# Patient Record
Sex: FEMALE | Race: BLACK OR AFRICAN AMERICAN | NOT HISPANIC OR LATINO | Employment: FULL TIME | URBAN - METROPOLITAN AREA
[De-identification: names, ages, dates, MRNs, and addresses within clinical notes are randomized per-mention and may not be internally consistent; named-entity substitution may affect disease eponyms.]

---

## 2021-08-14 ENCOUNTER — HOSPITAL ENCOUNTER (EMERGENCY)
Facility: HOSPITAL | Age: 56
Discharge: HOME | End: 2021-08-14
Attending: EMERGENCY MEDICINE
Payer: COMMERCIAL

## 2021-08-14 ENCOUNTER — APPOINTMENT (EMERGENCY)
Dept: RADIOLOGY | Facility: HOSPITAL | Age: 56
End: 2021-08-14
Payer: COMMERCIAL

## 2021-08-14 VITALS
DIASTOLIC BLOOD PRESSURE: 78 MMHG | SYSTOLIC BLOOD PRESSURE: 141 MMHG | WEIGHT: 202.38 LBS | RESPIRATION RATE: 18 BRPM | OXYGEN SATURATION: 98 % | HEART RATE: 67 BPM | TEMPERATURE: 98.3 F

## 2021-08-14 DIAGNOSIS — S80.812A ABRASION, LEFT LOWER LEG, INITIAL ENCOUNTER: ICD-10-CM

## 2021-08-14 DIAGNOSIS — M79.605 BILATERAL LEG PAIN: Primary | ICD-10-CM

## 2021-08-14 DIAGNOSIS — M79.604 BILATERAL LEG PAIN: Primary | ICD-10-CM

## 2021-08-14 PROCEDURE — 73590 X-RAY EXAM OF LOWER LEG: CPT

## 2021-08-14 PROCEDURE — 99284 EMERGENCY DEPT VISIT MOD MDM: CPT | Performed by: EMERGENCY MEDICINE

## 2021-08-14 PROCEDURE — 90715 TDAP VACCINE 7 YRS/> IM: CPT | Performed by: EMERGENCY MEDICINE

## 2021-08-14 PROCEDURE — 99283 EMERGENCY DEPT VISIT LOW MDM: CPT

## 2021-08-14 PROCEDURE — 90471 IMMUNIZATION ADMIN: CPT

## 2021-08-14 RX ORDER — OMEPRAZOLE 20 MG/1
20 CAPSULE, DELAYED RELEASE ORAL DAILY
COMMUNITY
Start: 2021-08-04

## 2021-08-14 RX ORDER — NAPROXEN 500 MG/1
500 TABLET ORAL 2 TIMES DAILY PRN
COMMUNITY

## 2021-08-14 RX ORDER — AMLODIPINE BESYLATE 10 MG/1
10 TABLET ORAL DAILY
COMMUNITY
Start: 2021-08-04

## 2021-08-14 RX ADMIN — TETANUS TOXOID, REDUCED DIPHTHERIA TOXOID AND ACELLULAR PERTUSSIS VACCINE, ADSORBED 0.5 ML: 5; 2.5; 8; 8; 2.5 SUSPENSION INTRAMUSCULAR at 17:58
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
## 2021-08-16 RX ORDER — NAPROXEN 500 MG/1
500 TABLET ORAL 2 TIMES DAILY WITH MEALS
Qty: 30 TABLET | Refills: 0 | Status: SHIPPED | OUTPATIENT
Start: 2021-08-16

## 2024-03-05 ENCOUNTER — HOSPITAL ENCOUNTER (EMERGENCY)
Facility: HOSPITAL | Age: 59
Discharge: HOME | End: 2024-03-05
Attending: EMERGENCY MEDICINE
Payer: COMMERCIAL

## 2024-03-05 VITALS
SYSTOLIC BLOOD PRESSURE: 137 MMHG | HEART RATE: 85 BPM | DIASTOLIC BLOOD PRESSURE: 78 MMHG | WEIGHT: 175.27 LBS | RESPIRATION RATE: 16 BRPM | TEMPERATURE: 98.4 F | OXYGEN SATURATION: 97 %

## 2024-03-05 DIAGNOSIS — J02.0 STREP PHARYNGITIS: Primary | ICD-10-CM

## 2024-03-05 PROCEDURE — 99283 EMERGENCY DEPT VISIT LOW MDM: CPT

## 2024-03-05 PROCEDURE — 99284 EMERGENCY DEPT VISIT MOD MDM: CPT

## 2024-03-05 RX ORDER — AMOXICILLIN 500 MG/1
500 CAPSULE ORAL EVERY 12 HOURS SCHEDULED
Qty: 20 CAPSULE | Refills: 0 | Status: SHIPPED | OUTPATIENT
Start: 2024-03-05 | End: 2024-03-15

## 2024-03-05 RX ORDER — DEXAMETHASONE 4 MG/1
8 TABLET ORAL ONCE
Status: COMPLETED | OUTPATIENT
Start: 2024-03-05 | End: 2024-03-05

## 2024-03-05 RX ORDER — AMOXICILLIN 250 MG/1
500 CAPSULE ORAL ONCE
Status: COMPLETED | OUTPATIENT
Start: 2024-03-05 | End: 2024-03-05

## 2024-03-05 RX ADMIN — DEXAMETHASONE 8 MG: 4 TABLET ORAL at 00:55

## 2024-03-05 RX ADMIN — AMOXICILLIN 500 MG: 250 CAPSULE ORAL at 00:55

## 2024-03-05 NOTE — ED PROVIDER NOTES
History  Chief Complaint   Patient presents with    Sore Throat     Pt c/o chills and sore throat.      58-year-old female presents for evaluation of chills and sore throat for 2 days.  Patient's son diagnosed with strep a couple days ago.  Minimal cough.        Prior to Admission Medications   Prescriptions Last Dose Informant Patient Reported? Taking?   amLODIPine (NORVASC) 10 mg tablet   Yes No   Sig: Take 10 mg by mouth daily   mupirocin (BACTROBAN) 2 % ointment   No No   Sig: Apply topically 3 (three) times a day   naproxen (EC NAPROSYN) 500 MG EC tablet   Yes No   Sig: Take 500 mg by mouth 2 (two) times a day as needed   naproxen (NAPROSYN) 500 mg tablet   No No   Sig: Take 1 tablet (500 mg total) by mouth 2 (two) times a day with meals   omeprazole (PriLOSEC) 20 mg delayed release capsule   Yes No   Sig: Take 20 mg by mouth daily      Facility-Administered Medications: None       No past medical history on file.    Past Surgical History:   Procedure Laterality Date    TUBAL LIGATION  1994       No family history on file.  I have reviewed and agree with the history as documented.    E-Cigarette/Vaping     E-Cigarette/Vaping Substances     Social History     Tobacco Use    Smoking status: Never    Smokeless tobacco: Never   Substance Use Topics    Alcohol use: Never    Drug use: Never       Review of Systems   Constitutional:  Positive for chills. Negative for fever.   HENT:  Positive for sore throat and trouble swallowing. Negative for ear pain.    Eyes:  Negative for pain and visual disturbance.   Respiratory:  Negative for cough and shortness of breath.    Cardiovascular:  Negative for chest pain and palpitations.   Gastrointestinal:  Negative for abdominal pain and vomiting.   Genitourinary:  Negative for dysuria and hematuria.   Musculoskeletal:  Negative for arthralgias and back pain.   Skin:  Negative for color change and rash.   Neurological:  Negative for seizures and syncope.   All other systems  reviewed and are negative.      Physical Exam  Physical Exam  Vitals and nursing note reviewed.   Constitutional:       General: She is not in acute distress.     Appearance: She is well-developed.   HENT:      Head: Normocephalic and atraumatic.      Mouth/Throat:      Pharynx: Posterior oropharyngeal erythema present.      Tonsils: Tonsillar exudate present. No tonsillar abscesses. 2+ on the right. 2+ on the left.   Eyes:      Conjunctiva/sclera: Conjunctivae normal.   Cardiovascular:      Rate and Rhythm: Normal rate and regular rhythm.      Heart sounds: No murmur heard.  Pulmonary:      Effort: Pulmonary effort is normal. No respiratory distress.      Breath sounds: Normal breath sounds.   Abdominal:      Palpations: Abdomen is soft.      Tenderness: There is no abdominal tenderness.   Musculoskeletal:         General: No swelling.      Cervical back: Neck supple.   Lymphadenopathy:      Cervical: Cervical adenopathy present.   Skin:     General: Skin is warm and dry.      Capillary Refill: Capillary refill takes less than 2 seconds.   Neurological:      Mental Status: She is alert.   Psychiatric:         Mood and Affect: Mood normal.         Vital Signs  ED Triage Vitals [03/05/24 0034]   Temperature Pulse Respirations Blood Pressure SpO2   98.4 °F (36.9 °C) 85 16 137/78 97 %      Temp Source Heart Rate Source Patient Position - Orthostatic VS BP Location FiO2 (%)   Oral Monitor Sitting Right arm --      Pain Score       8           Vitals:    03/05/24 0034   BP: 137/78   Pulse: 85   Patient Position - Orthostatic VS: Sitting         Visual Acuity      ED Medications  Medications   dexamethasone (DECADRON) tablet 8 mg (8 mg Oral Given 3/5/24 0055)   amoxicillin (AMOXIL) capsule 500 mg (500 mg Oral Given 3/5/24 0055)       Diagnostic Studies  Results Reviewed       None                   No orders to display              Procedures  Procedures         ED Course                               SBIRT 22yo+       Flowsheet Row Most Recent Value   Initial Alcohol Screen: US AUDIT-C     1. How often do you have a drink containing alcohol? 0 Filed at: 03/05/2024 0058   2. How many drinks containing alcohol do you have on a typical day you are drinking?  0 Filed at: 03/05/2024 0058   3b. FEMALE Any Age, or MALE 65+: How often do you have 4 or more drinks on one occassion? 0 Filed at: 03/05/2024 0058   Audit-C Score 0 Filed at: 03/05/2024 0058   JOSH: How many times in the past year have you...    Used an illegal drug or used a prescription medication for non-medical reasons? Never Filed at: 03/05/2024 0036                      Medical Decision Making  58-year-old female presents for evaluation of chills and sore throat.  Son with strep throat.  Exam: Bilateral tonsillar swelling 2+, exudates present, posterior oropharyngeal erythema; bilateral enlarged and tender cervical adenopathy.    Considering subjective chills, tonsillar swelling exudates, tender cervical adenopathy, no significant cough -will treat empirically for strep throat with amoxicillin.  Will also give dose of Decadron for oropharyngeal swelling.  Educate patient on diagnosis, treatment plan, supportive care, expectations. Patient expresses understanding of the condition, treatment plan, follow-up instructions, and return precautions.      Risk  Prescription drug management.             Disposition  Final diagnoses:   Strep pharyngitis     Time reflects when diagnosis was documented in both MDM as applicable and the Disposition within this note       Time User Action Codes Description Comment    3/5/2024  1:08 AM Javier Garg Add [J02.0] Strep pharyngitis           ED Disposition       ED Disposition   Discharge    Condition   Stable    Date/Time   Tue Mar 5, 2024 0108    Comment   Yohana Sarmiento discharge to home/self care.                   Follow-up Information    None         Discharge Medication List as of 3/5/2024  1:10 AM        START taking these  medications    Details   amoxicillin (AMOXIL) 500 mg capsule Take 1 capsule (500 mg total) by mouth every 12 (twelve) hours for 10 days, Starting Tue 3/5/2024, Until Fri 3/15/2024, Normal           CONTINUE these medications which have NOT CHANGED    Details   amLODIPine (NORVASC) 10 mg tablet Take 10 mg by mouth daily, Starting Wed 8/4/2021, Historical Med      mupirocin (BACTROBAN) 2 % ointment Apply topically 3 (three) times a day, Starting Mon 8/16/2021, Normal      naproxen (EC NAPROSYN) 500 MG EC tablet Take 500 mg by mouth 2 (two) times a day as needed, Historical Med      naproxen (NAPROSYN) 500 mg tablet Take 1 tablet (500 mg total) by mouth 2 (two) times a day with meals, Starting Mon 8/16/2021, Normal      omeprazole (PriLOSEC) 20 mg delayed release capsule Take 20 mg by mouth daily, Starting Wed 8/4/2021, Historical Med             No discharge procedures on file.    PDMP Review       None            ED Provider  Electronically Signed by             Javier Garg PA-C  03/05/24 0450

## 2024-03-05 NOTE — DISCHARGE INSTRUCTIONS
Based on your history and physical exam, you most likely have strep throat.  Please continue taking amoxicillin, 1 tablet by mouth, 2 times each day, for 10 days.  Please continue treating your pain with ibuprofen and Tylenol as needed.  Drink warm fluids such as soup and tea.  You can try other supportive methods such as lozenges.

## 2024-07-12 ENCOUNTER — APPOINTMENT (EMERGENCY)
Dept: RADIOLOGY | Facility: HOSPITAL | Age: 59
End: 2024-07-12
Payer: COMMERCIAL

## 2024-07-12 ENCOUNTER — HOSPITAL ENCOUNTER (EMERGENCY)
Facility: HOSPITAL | Age: 59
Discharge: HOME | End: 2024-07-12
Attending: EMERGENCY MEDICINE
Payer: COMMERCIAL

## 2024-07-12 VITALS
SYSTOLIC BLOOD PRESSURE: 163 MMHG | HEART RATE: 97 BPM | TEMPERATURE: 98.2 F | DIASTOLIC BLOOD PRESSURE: 96 MMHG | OXYGEN SATURATION: 100 % | RESPIRATION RATE: 18 BRPM

## 2024-07-12 DIAGNOSIS — M19.90 OSTEOARTHRITIS: Primary | ICD-10-CM

## 2024-07-12 DIAGNOSIS — E78.5 HLD (HYPERLIPIDEMIA): ICD-10-CM

## 2024-07-12 DIAGNOSIS — I10 HTN (HYPERTENSION): ICD-10-CM

## 2024-07-12 PROCEDURE — 73564 X-RAY EXAM KNEE 4 OR MORE: CPT

## 2024-07-12 PROCEDURE — 99284 EMERGENCY DEPT VISIT MOD MDM: CPT | Performed by: EMERGENCY MEDICINE

## 2024-07-12 PROCEDURE — 96372 THER/PROPH/DIAG INJ SC/IM: CPT

## 2024-07-12 PROCEDURE — 99283 EMERGENCY DEPT VISIT LOW MDM: CPT

## 2024-07-12 RX ORDER — LIDOCAINE 50 MG/G
1 PATCH TOPICAL ONCE
Status: DISCONTINUED | OUTPATIENT
Start: 2024-07-12 | End: 2024-07-12 | Stop reason: HOSPADM

## 2024-07-12 RX ORDER — METHOCARBAMOL 500 MG/1
500 TABLET, FILM COATED ORAL ONCE
Status: COMPLETED | OUTPATIENT
Start: 2024-07-12 | End: 2024-07-12

## 2024-07-12 RX ORDER — ACETAMINOPHEN 325 MG/1
650 TABLET ORAL ONCE
Status: COMPLETED | OUTPATIENT
Start: 2024-07-12 | End: 2024-07-12

## 2024-07-12 RX ORDER — PREDNISONE 20 MG/1
60 TABLET ORAL ONCE
Status: COMPLETED | OUTPATIENT
Start: 2024-07-12 | End: 2024-07-12

## 2024-07-12 RX ORDER — METHYLPREDNISOLONE 4 MG/1
TABLET ORAL
Qty: 21 TABLET | Refills: 0 | Status: SHIPPED | OUTPATIENT
Start: 2024-07-12

## 2024-07-12 RX ORDER — KETOROLAC TROMETHAMINE 30 MG/ML
30 INJECTION, SOLUTION INTRAMUSCULAR; INTRAVENOUS ONCE
Status: COMPLETED | OUTPATIENT
Start: 2024-07-12 | End: 2024-07-12

## 2024-07-12 RX ORDER — AMLODIPINE BESYLATE 10 MG/1
10 TABLET ORAL DAILY
Qty: 30 TABLET | Refills: 0 | Status: SHIPPED | OUTPATIENT
Start: 2024-07-12 | End: 2024-08-11

## 2024-07-12 RX ORDER — METHOCARBAMOL 500 MG/1
500 TABLET, FILM COATED ORAL 2 TIMES DAILY
Qty: 20 TABLET | Refills: 0 | Status: SHIPPED | OUTPATIENT
Start: 2024-07-12

## 2024-07-12 RX ADMIN — KETOROLAC TROMETHAMINE 30 MG: 30 INJECTION, SOLUTION INTRAMUSCULAR; INTRAVENOUS at 15:40

## 2024-07-12 RX ADMIN — LIDOCAINE 1 PATCH: 50 PATCH CUTANEOUS at 15:40

## 2024-07-12 RX ADMIN — ACETAMINOPHEN 650 MG: 325 TABLET, FILM COATED ORAL at 15:40

## 2024-07-12 RX ADMIN — METHOCARBAMOL 500 MG: 500 TABLET ORAL at 16:07

## 2024-07-12 RX ADMIN — PREDNISONE 60 MG: 20 TABLET ORAL at 16:51

## 2024-07-12 NOTE — ED PROVIDER NOTES
"History  Chief Complaint   Patient presents with    Leg Pain     Pt reports L leg pain. Pt states that it started last night. Pt decribes it as warm. Pt denies any injury.      PatientIs a 58-year-old female coming in today complaining of pain from her left back/buttock down her left leg.  Patient has a history of hypertension and hyperlipidemia as well as \"arthritis\" patient states this started several days ago and progressively worsening.  She reports that her left knee is bothering her as well.  She has no trauma or injury.  No fevers, chills, IV drug abuse.  No saddle anesthesia or urinary retention.  She denies any focal weakness and/or paresthesias throughout the bilateral lower extremities.  She states that she has had this before but not in a while.  She has pain that intermittently radiates down the left leg.  She took Tylenol without any relief      History provided by:  Medical records and patient   used: No    Leg Pain  Location:  Hip, knee and leg  Injury: no    Hip location:  L hip  Leg location:  L upper leg  Knee location:  L knee  Pain details:     Quality:  Aching, pressure and shooting    Severity:  Mild    Onset quality:  Gradual    Timing:  Intermittent    Progression:  Waxing and waning  Chronicity:  Recurrent  Dislocation: no    Tetanus status:  Up to date  Prior injury to area:  No  Relieved by:  Nothing  Worsened by:  Nothing  Ineffective treatments:  Acetaminophen  Associated symptoms: no back pain, no decreased ROM, no fatigue, no fever, no itching, no muscle weakness, no neck pain, no numbness, no stiffness, no swelling and no tingling    Risk factors: no concern for non-accidental trauma, no frequent fractures, no known bone disorder, no obesity and no recent illness                Prior to Admission Medications   Prescriptions Last Dose Informant Patient Reported? Taking?   amLODIPine (NORVASC) 10 mg tablet   Yes No   Sig: Take 10 mg by mouth daily   amLODIPine " (NORVASC) 10 mg tablet   No Yes   Sig: Take 1 tablet (10 mg total) by mouth daily   mupirocin (BACTROBAN) 2 % ointment   No No   Sig: Apply topically 3 (three) times a day   naproxen (EC NAPROSYN) 500 MG EC tablet   Yes No   Sig: Take 500 mg by mouth 2 (two) times a day as needed   naproxen (NAPROSYN) 500 mg tablet   No No   Sig: Take 1 tablet (500 mg total) by mouth 2 (two) times a day with meals   omeprazole (PriLOSEC) 20 mg delayed release capsule   Yes No   Sig: Take 20 mg by mouth daily      Facility-Administered Medications: None       History reviewed. No pertinent past medical history.    Past Surgical History:   Procedure Laterality Date    TUBAL LIGATION  1994       History reviewed. No pertinent family history.  I have reviewed and agree with the history as documented.    E-Cigarette/Vaping     E-Cigarette/Vaping Substances     Social History     Tobacco Use    Smoking status: Never    Smokeless tobacco: Never   Substance Use Topics    Alcohol use: Never    Drug use: Never       Review of Systems   Constitutional: Negative.  Negative for chills, fatigue and fever.   HENT: Negative.  Negative for ear pain and sore throat.    Eyes: Negative.  Negative for pain and visual disturbance.   Respiratory: Negative.  Negative for cough and shortness of breath.    Cardiovascular: Negative.  Negative for chest pain and palpitations.   Gastrointestinal: Negative.  Negative for abdominal pain and vomiting.   Genitourinary: Negative.  Negative for dysuria and hematuria.   Musculoskeletal: Negative.  Negative for arthralgias, back pain, neck pain and stiffness.   Skin:  Negative for color change, itching and rash.   Neurological:  Negative for seizures and syncope.   Hematological: Negative.    Psychiatric/Behavioral: Negative.     All other systems reviewed and are negative.      Physical Exam  Physical Exam  Vitals and nursing note reviewed.   Constitutional:       General: She is not in acute distress.     Appearance:  She is well-developed. She is not diaphoretic.   HENT:      Head: Normocephalic and atraumatic.      Nose: Nose normal.      Mouth/Throat:      Pharynx: No oropharyngeal exudate.   Eyes:      General: No scleral icterus.     Extraocular Movements: Extraocular movements intact.      Conjunctiva/sclera: Conjunctivae normal.      Pupils: Pupils are equal, round, and reactive to light.   Neck:      Trachea: No tracheal deviation.   Cardiovascular:      Pulses:           Popliteal pulses are 2+ on the right side and 2+ on the left side.        Dorsalis pedis pulses are 2+ on the right side and 2+ on the left side.   Pulmonary:      Effort: Pulmonary effort is normal. No respiratory distress.   Abdominal:      General: Bowel sounds are normal. There is no distension.      Palpations: Abdomen is soft.      Tenderness: There is no abdominal tenderness. There is no rebound.   Musculoskeletal:         General: No tenderness or deformity. Normal range of motion.      Cervical back: Normal range of motion and neck supple.        Back:       Right lower leg: No edema.      Left lower leg: No edema.        Legs:    Skin:     General: Skin is warm and dry.      Findings: No erythema or rash.   Neurological:      General: No focal deficit present.      Mental Status: She is alert and oriented to person, place, and time.      GCS: GCS eye subscore is 4. GCS verbal subscore is 5. GCS motor subscore is 6.      Cranial Nerves: No cranial nerve deficit.      Sensory: Sensation is intact.      Motor: Motor function is intact.      Coordination: Coordination normal.      Deep Tendon Reflexes: Reflexes are normal and symmetric.      Comments: Patient is followed range of motion of bilateral hips, his knees and ankles without any discomfort to her low back pain-free.  She does have some discomfort to bilateral knees with bilateral knee flexion.   Psychiatric:         Mood and Affect: Mood normal.         Behavior: Behavior normal.          "Thought Content: Thought content normal.         Judgment: Judgment normal.         Vital Signs  ED Triage Vitals [07/12/24 1442]   Temperature Pulse Respirations Blood Pressure SpO2   98.2 °F (36.8 °C) 67 16 149/88 99 %      Temp Source Heart Rate Source Patient Position - Orthostatic VS BP Location FiO2 (%)   Oral Monitor Lying Right arm --      Pain Score       10 - Worst Possible Pain           Vitals:    07/12/24 1442 07/12/24 1653   BP: 149/88 163/96   Pulse: 67 97   Patient Position - Orthostatic VS: Lying Sitting         Visual Acuity      ED Medications  Medications   acetaminophen (TYLENOL) tablet 650 mg (650 mg Oral Given 7/12/24 1540)   ketorolac (TORADOL) injection 30 mg (30 mg Intramuscular Given 7/12/24 1540)   methocarbamol (ROBAXIN) tablet 500 mg (500 mg Oral Given 7/12/24 1607)   predniSONE tablet 60 mg (60 mg Oral Given 7/12/24 1651)       Diagnostic Studies  Results Reviewed       None                   XR knee 4+ vw left injury   Final Result by Chip Galvez MD (07/12 1607)      No acute osseous abnormality.      Degenerative changes as described.         Computerized Assisted Algorithm (CAA) may have been used to analyze all applicable images.         Workstation performed: [id number]                    Procedures  Procedures         ED Course  ED Course as of 07/12/24 1910 Fri Jul 12, 2024   1521 Patient is a 58 year old female coming in with onset of back pain radiating into LLE starting yesterday. On exam, no obvious injury or deformity. VSS and neuro intact. Will obtain UA/PVR, Lspine xray.      Disclosure: Voice to text software was used in the preparation of this document and could have resulted in translational errors.      Occasional wrong word or \"sound a like\" substitutions may have occurred due to the inherent limitations of voice recognition software.  Read the chart carefully and recognize, using context, where substitutions have occurred.       I have independently reviewed " external records are available to me to the level of detail possible within the time constraints of my patient care responsibilities in the ED.       1541 Received message that patient is declining x-ray of her lumbar spine and wants an x-ray of her right leg.  Patient had no complaints of right lower extremity pain on my exam   1550 Patient declines lumbar spine x-ray after talking with her.  She is aware of my concern that this is a radiculopathy and cannot rule out lumbar pathology and patient understands this wishes for left knee x-ray only   1615 Severe tricompartmental osteoarthritis, evidenced by full-thickness articular cartilage loss, marginal osteophytes, and subchondral sclerosis and cysts.   1650 Long discussion with patient regarding x-ray.  Patient states that she just moved to the area.  Will put amatory referral in for PCP as well as orthopedics.  Patient is out of her hypertensive medications we will refill amlodipine as well.  Return to ER instructions given as well as discussed with her strengthening and stretching exercises.  Answered questions answered at bedside    Counseling: I had a detailed discussion with the patient and/or guardian regarding: the historical points, exam findings, and any diagnostic results supporting the discharge diagnosis, lab results, radiology results, discharge instructions reviewed with patient and/or family/caregiver and understanding was verbalized. Instructions given to return to the emergency department if symptoms worsen or persist, or if there are any questions or concerns that arise at home.     All imaging and/or lab testing discussed with patient, strict return to ED precautions discussed. Patient recommended to follow up promptly with appropriate outpatient provider. Patient and/or family members verbalizes understanding and agrees with plan. Patient and/or family members were given opportunity to ask questions, all questions were answered at this time.  Patient is stable for discharge                                     SBIRT 22yo+      Flowsheet Row Most Recent Value   Initial Alcohol Screen: US AUDIT-C     1. How often do you have a drink containing alcohol? 0 Filed at: 07/12/2024 1443   2. How many drinks containing alcohol do you have on a typical day you are drinking?  0 Filed at: 07/12/2024 1443   3b. FEMALE Any Age, or MALE 65+: How often do you have 4 or more drinks on one occassion? 0 Filed at: 07/12/2024 1443   Audit-C Score 0 Filed at: 07/12/2024 1443   JOSH: How many times in the past year have you...    Used an illegal drug or used a prescription medication for non-medical reasons? Never Filed at: 07/12/2024 1443                      Medical Decision Making  Differential diagnosis includes but not limited to:  Fracture, stress fracture, DVT, superficial thrombophlebitis, cellulitis, renal failure, ruptured Baker cyst, venous insufficiency, lymphadenitis, lymphedema, trauma    Based on history and physical concern for worsening degenerative changes versus sciatica.  Did consider DVT versus fracture versus stress fracture but patient's history and physical reveals this less likely.    Amount and/or Complexity of Data Reviewed  External Data Reviewed: notes.     Details: Chart review shows that patient follows closely in [place] and does have a history of bilateral knee osteoarthritis, hypertension, hyperlipidemia.  Patient was last seen in March by her PCP in [place] and was diagnosed with chronic bilateral low back pain without sciatica supposed to be taken Lidoderm patches as well as cyclobenzaprine, Tylenol, PT as well as topical Voltaren gel.  Radiology: ordered and independent interpretation performed. Decision-making details documented in ED Course.     Details: Left knee x-ray interpreted by myself at Severe tricompartmental osteoarthritis, evidenced by full-thickness articular cartilage loss, marginal osteophytes, and subchondral sclerosis and  cysts.    Risk  OTC drugs.  Prescription drug management.                 Disposition  Final diagnoses:   Osteoarthritis   HTN (hypertension)   HLD (hyperlipidemia)     Time reflects when diagnosis was documented in both MDM as applicable and the Disposition within this note       Time User Action Codes Description Comment    7/12/2024  4:16 PM Bendnixon, Nasra L Add [M19.90] Osteoarthritis     7/12/2024  4:48 PM Bendock, Nasra L Add [I10] HTN (hypertension)     7/12/2024  4:48 PM Bendock, Nasra L Add [E78.5] HLD (hyperlipidemia)           ED Disposition       ED Disposition   Discharge    Condition   Stable    Date/Time   Fri Jul 12, 2024 1616    Comment   Yohana Casonno discharge to home/self care.                   Follow-up Information       Follow up With Specialties Details Why Contact Info Additional Information    [facility] [place] [facility] Schedule an appointment as soon as possible for a visit in 1 week  [address]  [facility] [address] [phone] [facility] [place], [facility] [address], [address], [place], [place], [address] [phone]            Discharge Medication List as of 7/12/2024  4:49 PM        START taking these medications    Details   Diclofenac Sodium (VOLTAREN) 1 % Apply 2 g topically 4 (four) times a day, Starting Fri 7/12/2024, Normal      methocarbamol (ROBAXIN) 500 mg tablet Take 1 tablet (500 mg total) by mouth 2 (two) times a day, Starting Fri 7/12/2024, Normal      methylPREDNISolone 4 MG tablet therapy pack Use as directed on package, Normal           CONTINUE these medications which have CHANGED    Details   amLODIPine (NORVASC) 10 mg tablet Take 1 tablet (10 mg total) by mouth daily, Starting Fri 7/12/2024, Until Sun 8/11/2024, Normal           CONTINUE these medications which have NOT CHANGED    Details   mupirocin (BACTROBAN) 2 % ointment Apply topically 3 (three) times a day,  Starting Mon 8/16/2021, Normal      naproxen (EC NAPROSYN) 500 MG EC tablet Take 500 mg by mouth 2 (two) times a day as needed, Historical Med      naproxen (NAPROSYN) 500 mg tablet Take 1 tablet (500 mg total) by mouth 2 (two) times a day with meals, Starting Mon 8/16/2021, Normal      omeprazole (PriLOSEC) 20 mg delayed release capsule Take 20 mg by mouth daily, Starting Wed 8/4/2021, Historical Med                 PDMP Review       None            ED Provider  Electronically Signed by             Nasra Weber DO  07/12/24 1910

## 2024-07-12 NOTE — Clinical Note
Yohana Sarmiento was seen and treated in our emergency department on 7/12/2024.                Diagnosis:     Yohana  .    She may return on this date:          If you have any questions or concerns, please don't hesitate to call.      Nasra Weber, DO    ______________________________           _______________          _______________  Hospital Representative                              Date                                Time

## 2024-07-12 NOTE — Clinical Note
Yohana Sarmiento was seen and treated in our emergency department on 7/12/2024.    No restrictions            Diagnosis:     Yohana  may return to work on return date.    She may return on this date: 07/13/2024         If you have any questions or concerns, please don't hesitate to call.      Donna Avendano RN    ______________________________           _______________          _______________  Hospital Representative                              Date                                Time

## 2024-07-12 NOTE — Clinical Note
Yohana Sarmiento was seen and treated in our emergency department on 7/12/2024.    No restrictions            Diagnosis:     Yohana  may return to work on return date.    She may return on this date: 07/16/2024         If you have any questions or concerns, please don't hesitate to call.      Donna Avendano RN    ______________________________           _______________          _______________  Hospital Representative                              Date                                Time

## 2024-07-24 ENCOUNTER — RA CDI HCC (OUTPATIENT)
Dept: OTHER | Facility: HOSPITAL | Age: 59
End: 2024-07-24

## 2024-07-30 PROBLEM — G89.29 CHRONIC BILATERAL LOW BACK PAIN WITHOUT SCIATICA: Status: ACTIVE | Noted: 2022-02-24

## 2024-07-30 PROBLEM — M19.90 OSTEOARTHRITIS: Status: ACTIVE | Noted: 2019-02-20

## 2024-07-30 PROBLEM — M54.50 CHRONIC BILATERAL LOW BACK PAIN WITHOUT SCIATICA: Status: ACTIVE | Noted: 2022-02-24

## 2024-08-12 ENCOUNTER — OFFICE VISIT (OUTPATIENT)
Dept: FAMILY MEDICINE CLINIC | Facility: CLINIC | Age: 59
End: 2024-08-12
Payer: COMMERCIAL

## 2024-08-12 VITALS
HEART RATE: 90 BPM | TEMPERATURE: 97.9 F | SYSTOLIC BLOOD PRESSURE: 126 MMHG | BODY MASS INDEX: 26.03 KG/M2 | HEIGHT: 70 IN | OXYGEN SATURATION: 98 % | DIASTOLIC BLOOD PRESSURE: 82 MMHG | WEIGHT: 181.8 LBS

## 2024-08-12 DIAGNOSIS — E78.5 HLD (HYPERLIPIDEMIA): ICD-10-CM

## 2024-08-12 DIAGNOSIS — M79.10 MYALGIA: ICD-10-CM

## 2024-08-12 DIAGNOSIS — M19.90 OSTEOARTHRITIS: ICD-10-CM

## 2024-08-12 DIAGNOSIS — G89.29 CHRONIC BILATERAL LOW BACK PAIN WITHOUT SCIATICA: ICD-10-CM

## 2024-08-12 DIAGNOSIS — Z11.59 NEED FOR HEPATITIS C SCREENING TEST: ICD-10-CM

## 2024-08-12 DIAGNOSIS — I10 ESSENTIAL HYPERTENSION: ICD-10-CM

## 2024-08-12 DIAGNOSIS — K21.9 GASTROESOPHAGEAL REFLUX DISEASE, UNSPECIFIED WHETHER ESOPHAGITIS PRESENT: ICD-10-CM

## 2024-08-12 DIAGNOSIS — Z11.4 SCREENING FOR HIV (HUMAN IMMUNODEFICIENCY VIRUS): ICD-10-CM

## 2024-08-12 DIAGNOSIS — E78.2 MIXED HYPERLIPIDEMIA: ICD-10-CM

## 2024-08-12 DIAGNOSIS — Z12.31 ENCOUNTER FOR SCREENING MAMMOGRAM FOR MALIGNANT NEOPLASM OF BREAST: Primary | ICD-10-CM

## 2024-08-12 DIAGNOSIS — I10 HTN (HYPERTENSION): ICD-10-CM

## 2024-08-12 DIAGNOSIS — M54.50 CHRONIC BILATERAL LOW BACK PAIN WITHOUT SCIATICA: ICD-10-CM

## 2024-08-12 DIAGNOSIS — Z12.11 SCREENING FOR COLON CANCER: ICD-10-CM

## 2024-08-12 PROCEDURE — 99204 OFFICE O/P NEW MOD 45 MIN: CPT | Performed by: PHYSICIAN ASSISTANT

## 2024-08-12 RX ORDER — AMLODIPINE BESYLATE 10 MG/1
10 TABLET ORAL DAILY
Qty: 30 TABLET | Refills: 0 | Status: SHIPPED | OUTPATIENT
Start: 2024-08-12 | End: 2024-08-13

## 2024-08-12 RX ORDER — ROSUVASTATIN CALCIUM 5 MG/1
5 TABLET, COATED ORAL DAILY
Qty: 30 TABLET | Refills: 11 | Status: SHIPPED | OUTPATIENT
Start: 2024-08-12 | End: 2024-08-13

## 2024-08-12 NOTE — PATIENT INSTRUCTIONS
1. Encounter for screening mammogram for malignant neoplasm of breast  -     Mammo screening bilateral w 3d & cad; Future  2. Screening for colon cancer  -     Ambulatory Referral to Gastroenterology; Future  3. Osteoarthritis  Assessment & Plan:  L knee with severe arthritis. IN [place] injections only helped for a few months.   Orders:  -     Ambulatory Referral to Family Practice  4. HTN (hypertension)  -     Ambulatory Referral to Family Practice  -     amLODIPine (NORVASC) 10 mg tablet; Take 1 tablet (10 mg total) by mouth daily  5. HLD (hyperlipidemia)  -     Ambulatory Referral to Family Practice  -     Lipid Panel with Direct LDL reflex  6. Essential hypertension  Assessment & Plan:  Well-controlled on Norvasc 10.  Refills given.  CBC and CMP ordered.  Orders:  -     CBC and differential  -     Comprehensive metabolic panel  7. Mixed hyperlipidemia  Assessment & Plan:  Patient is on Crestor 5 and needs a lipid panel.  Pt has been not taking it for the past month. This will play into what we will do with her results if still high. Fasting lipid panel ordered.  Orders:  -     rosuvastatin (CRESTOR) 5 mg tablet; Take 1 tablet (5 mg total) by mouth daily  8. Myalgia  -     Vitamin D 25 hydroxy  9. Need for hepatitis C screening test  -     Hepatitis C Antibody; Future  10. Screening for HIV (human immunodeficiency virus)  -     HIV 1/2 AG/AB w Reflex [facility] for 2 yr old and above; Future  11. Gastroesophageal reflux disease, unspecified whether esophagitis present  Assessment & Plan:  Had EGD in 2018. Uses pepcid twice a day.   12. Chronic bilateral low back pain without sciatica  Assessment & Plan:  PT did have a lumbar spine xray ordered which was cancelled so it was reordered today.   Orders:  -     XR spine lumbar minimum 4 views non injury; Future; Expected date: 08/12/2024

## 2024-08-12 NOTE — PROGRESS NOTES
Ambulatory Visit  Name: Yohana Sarmiento      : 1965      MRN: [id number]  Encounter Provider: Carla Arzola PA-C  Encounter Date: 2024   Encounter department: [facility]    Assessment & Plan   1. Encounter for screening mammogram for malignant neoplasm of breast  -     Mammo screening bilateral w 3d & cad; Future  2. Screening for colon cancer  -     Ambulatory Referral to Gastroenterology; Future  3. Osteoarthritis  Assessment & Plan:  L knee with severe arthritis. IN [place] injections only helped for a few months.   Orders:  -     Ambulatory Referral to Family Practice  4. HTN (hypertension)  -     Ambulatory Referral to Family Practice  -     amLODIPine (NORVASC) 10 mg tablet; Take 1 tablet (10 mg total) by mouth daily  5. HLD (hyperlipidemia)  -     Ambulatory Referral to Family Practice  -     Lipid Panel with Direct LDL reflex  6. Essential hypertension  Assessment & Plan:  Well-controlled on Norvasc 10.  Refills given.  CBC and CMP ordered.  Orders:  -     CBC and differential  -     Comprehensive metabolic panel  7. Mixed hyperlipidemia  Assessment & Plan:  Patient is on Crestor 5 and needs a lipid panel.  Pt has been not taking it for the past month. This will play into what we will do with her results if still high. Fasting lipid panel ordered.  Orders:  -     rosuvastatin (CRESTOR) 5 mg tablet; Take 1 tablet (5 mg total) by mouth daily  8. Myalgia  -     Vitamin D 25 hydroxy  9. Need for hepatitis C screening test  -     Hepatitis C Antibody; Future  10. Screening for HIV (human immunodeficiency virus)  -     HIV 1/2 AG/AB w Reflex [facility] for 2 yr old and above; Future  11. Gastroesophageal reflux disease, unspecified whether esophagitis present  Assessment & Plan:  Had EGD in 2018. Uses pepcid twice a day.   12. Chronic bilateral low back pain without sciatica  Assessment & Plan:  PT did have a lumbar spine xray ordered which was cancelled so it was reordered today.  "  Orders:  -     XR spine lumbar minimum 4 views non injury; Future; Expected date: 08/12/2024      Depression Screening and Follow-up Plan: Patient was screened for depression during today's encounter. They screened negative with a PHQ-2 score of 0.      History of Present Illness     Patient presents with:  New Patient Visit: Establishing care , moved from [place] back in April   Agreeable to mammo - pt last had colonoscopy 2021 , referral placed to schedule future apt   Hypertension: Needs refill on BP medication - pending   Arthritis: To soy knees    Per review of notes from [place] had 2018 colonoscopy and was due for repeat in 5 years. Records of procedure can not be found through the portal however.   Last blood work was done a year ago.  Patient states that she was put on Crestor 5 but has not been taking it for the last month not sure if she had blood work done after it was started.  She does have severe left knee arthritis and does take naproxen as needed.  Did have an appointment to see Ortho this was canceled so does need to reschedule this.  Use to do injections in [place] which only helped for short amount of time.  Most likely due for knee replacement.  Patient does also complain of right low back pain that comes and goes with referring down to her legs.  Muscle relaxer does not seem to help.  Did have a x-ray order but then it was canceled.        Review of Systems   Constitutional: Negative.    HENT: Negative.     Eyes: Negative.    Respiratory: Negative.     Cardiovascular: Negative.    Gastrointestinal: Negative.    Endocrine: Negative.    Genitourinary: Negative.    Musculoskeletal: Negative.    Skin: Negative.    Allergic/Immunologic: Negative.    Neurological: Negative.    Hematological: Negative.    Psychiatric/Behavioral: Negative.         Objective     /82 (BP Location: Right arm, Patient Position: Sitting, Cuff Size: Adult)   Pulse 90   Temp 97.9 °F (36.6 °C) (Temporal)   Ht 5' 10\" (1.778 " m)   Wt 82.5 kg (181 lb 12.8 oz)   SpO2 98%   BMI 26.09 kg/m²     Physical Exam  Vitals and nursing note reviewed.   Constitutional:       Appearance: Normal appearance. She is well-developed.   HENT:      Head: Normocephalic and atraumatic.   Eyes:      General: Lids are normal.      Conjunctiva/sclera: Conjunctivae normal.      Pupils: Pupils are equal, round, and reactive to light.   Cardiovascular:      Rate and Rhythm: Normal rate and regular rhythm.      Heart sounds: No murmur heard.  Pulmonary:      Effort: Pulmonary effort is normal.      Breath sounds: Normal breath sounds.   Musculoskeletal:      Comments: Patient is walking with a limp secondary to pain in her left knee.   Skin:     General: Skin is warm and dry.   Neurological:      General: No focal deficit present.      Mental Status: She is alert.      Coordination: Coordination is intact.   Psychiatric:         Mood and Affect: Mood normal.         Behavior: Behavior normal. Behavior is cooperative.         Thought Content: Thought content normal.         Judgment: Judgment normal.       Administrative Statements

## 2024-08-12 NOTE — ASSESSMENT & PLAN NOTE
Patient is on Crestor 5 and needs a lipid panel.  Pt has been not taking it for the past month. This will play into what we will do with her results if still high. Fasting lipid panel ordered.

## 2024-08-13 RX ORDER — ROSUVASTATIN CALCIUM 5 MG/1
5 TABLET, COATED ORAL DAILY
Qty: 90 TABLET | Refills: 0 | Status: SHIPPED | OUTPATIENT
Start: 2024-08-13

## 2024-08-13 RX ORDER — AMLODIPINE BESYLATE 10 MG/1
10 TABLET ORAL DAILY
Qty: 90 TABLET | Refills: 0 | Status: SHIPPED | OUTPATIENT
Start: 2024-08-13

## 2024-09-05 ENCOUNTER — TELEPHONE (OUTPATIENT)
Age: 59
End: 2024-09-05

## 2024-09-05 ENCOUNTER — PREP FOR PROCEDURE (OUTPATIENT)
Age: 59
End: 2024-09-05

## 2024-09-05 DIAGNOSIS — Z12.11 SPECIAL SCREENING FOR MALIGNANT NEOPLASMS, COLON: Primary | ICD-10-CM

## 2024-09-05 NOTE — TELEPHONE ENCOUNTER
09/05/24  Screened by: Allison Buckner MA    Referring Provider PCP    Pre- Screening:     There is no height or weight on file to calculate BMI.  Has patient been referred for a routine screening Colonoscopy? yes  Is the patient between 45-75 years old? yes      Previous Colonoscopy yes   If yes:    Date: 7283-2172    Facility: [place]    Reason: screening      Does the patient want to see a Gastroenterologist prior to their procedure OR are they having any GI symptoms? no    Has the patient been hospitalized or had abdominal surgery in the past 6 months? no    Does the patient use supplemental oxygen? no    Does the patient take Coumadin, Lovenox, Plavix, Elliquis, Xarelto, or other blood thinning medication? no    Has the patient had a stroke, cardiac event, or stent placed in the past year? no        If patient is between 45yrs - 49yrs, please advise patient that we will have to confirm benefits & coverage with their insurance company for a routine screening colonoscopy.

## 2024-09-05 NOTE — TELEPHONE ENCOUNTER
Scheduled date of colonoscopy (as of today): 9/27/2024   Physician performing colonoscopy: DR MATTSON  Location of colonoscopy: [facility]  Bowel prep reviewed with patient: MIRALAX/DULCOLAX  Instructions reviewed with patient by: Allison via telephone. Procedure directions sent via email [email or web address]'  Clearances: n/a

## 2024-09-13 ENCOUNTER — ANESTHESIA EVENT (OUTPATIENT)
Dept: ANESTHESIOLOGY | Facility: HOSPITAL | Age: 59
End: 2024-09-13

## 2024-09-13 ENCOUNTER — ANESTHESIA (OUTPATIENT)
Dept: ANESTHESIOLOGY | Facility: HOSPITAL | Age: 59
End: 2024-09-13

## 2024-09-20 ENCOUNTER — TELEPHONE (OUTPATIENT)
Age: 59
End: 2024-09-20

## 2024-09-20 NOTE — TELEPHONE ENCOUNTER
Scheduled date of colonoscopy (as of today): 12/9/24  Physician performing colonoscopy: Dr. Holguin  Location of colonoscopy: [facility]  Bowel prep reviewed with patient: Miralax / Dulcolax   Instructions reviewed with patient by: N/A  Clearances: N/A    Pt rescheduled her colonoscopy.  Advised pt to bring her insurance ID cards and any copayment if required by her insurance.

## 2024-11-08 ENCOUNTER — OFFICE VISIT (OUTPATIENT)
Dept: FAMILY MEDICINE CLINIC | Facility: CLINIC | Age: 59
End: 2024-11-08
Payer: COMMERCIAL

## 2024-11-08 VITALS
WEIGHT: 190 LBS | BODY MASS INDEX: 27.2 KG/M2 | HEART RATE: 67 BPM | HEIGHT: 70 IN | OXYGEN SATURATION: 97 % | SYSTOLIC BLOOD PRESSURE: 122 MMHG | DIASTOLIC BLOOD PRESSURE: 82 MMHG | TEMPERATURE: 97 F

## 2024-11-08 DIAGNOSIS — Z00.00 ANNUAL PHYSICAL EXAM: Primary | ICD-10-CM

## 2024-11-08 DIAGNOSIS — K21.9 GASTROESOPHAGEAL REFLUX DISEASE, UNSPECIFIED WHETHER ESOPHAGITIS PRESENT: ICD-10-CM

## 2024-11-08 DIAGNOSIS — Z12.4 SCREENING FOR CERVICAL CANCER: ICD-10-CM

## 2024-11-08 DIAGNOSIS — I10 ESSENTIAL HYPERTENSION: ICD-10-CM

## 2024-11-08 PROCEDURE — 99396 PREV VISIT EST AGE 40-64: CPT | Performed by: NURSE PRACTITIONER

## 2024-11-08 PROCEDURE — 99214 OFFICE O/P EST MOD 30 MIN: CPT | Performed by: NURSE PRACTITIONER

## 2024-11-08 RX ORDER — FAMOTIDINE 40 MG/1
40 TABLET, FILM COATED ORAL
Qty: 90 TABLET | Refills: 1 | Status: SHIPPED | OUTPATIENT
Start: 2024-11-08

## 2024-11-08 RX ORDER — PANTOPRAZOLE SODIUM 40 MG/1
40 TABLET, DELAYED RELEASE ORAL
Qty: 90 TABLET | Refills: 1 | Status: SHIPPED | OUTPATIENT
Start: 2024-11-08

## 2024-11-08 RX ORDER — AMLODIPINE BESYLATE 10 MG/1
10 TABLET ORAL DAILY
Qty: 90 TABLET | Refills: 1 | Status: SHIPPED | OUTPATIENT
Start: 2024-11-08

## 2024-11-08 NOTE — PROGRESS NOTES
Adult Annual Physical  Name: Yohana Sarmiento      : 1965      MRN: [id number]  Encounter Provider: EDWARD Gambino  Encounter Date: 2024   Encounter department: [facility]    Assessment & Plan  Annual physical exam         Gastroesophageal reflux disease, unspecified whether esophagitis present  Since the patient's GERD symptoms are currently uncontrolled she will be trialed on Pepcid 40 mg at bedtime and Protonix 40 mg prior to breakfast.  I did advise her to take the Protonix at least 30 minutes prior to eating in the morning.  Her symptoms can be reassessed at her next office visit.  Orders:    famotidine (PEPCID) 40 MG tablet; Take 1 tablet (40 mg total) by mouth daily at bedtime    pantoprazole (PROTONIX) 40 mg tablet; Take 1 tablet (40 mg total) by mouth daily before breakfast    Screening for cervical cancer    Orders:    Ambulatory Referral to Obstetrics / Gynecology; Future    Essential hypertension  Well-controlled on current regimen.    Orders:    amLODIPine (NORVASC) 10 mg tablet; Take 1 tablet (10 mg total) by mouth daily    Immunizations and preventive care screenings were discussed with patient today. Appropriate education was printed on patient's after visit summary.    Counseling:  Alcohol/drug use: discussed moderation in alcohol intake, the recommendations for healthy alcohol use, and avoidance of illicit drug use.  Dental Health: discussed importance of regular tooth brushing, flossing, and dental visits.  Injury prevention: discussed safety/seat belts, safety helmets, smoke detectors, carbon monoxide detectors, and smoking near bedding or upholstery.  Sexual health: discussed sexually transmitted diseases, partner selection, use of condoms, avoidance of unintended pregnancy, and contraceptive alternatives.  Exercise: the importance of regular exercise/physical activity was discussed. Recommend exercise 3-5 times per week for at least 30 minutes.       Hypertension: Well-controlled on current regimen.    GERD: Patient is currently managed on Pepcid 20 mg twice daily however, she reports that this medication is not completely controlling her GERD symptoms.    History of Present Illness     Adult Annual Physical:  Patient presents for annual physical.     Diet and Physical Activity:  - Diet/Nutrition: well balanced diet, consuming 3-5 servings of fruits/vegetables daily and limited junk food.  - Exercise: walking, 3-4 times a week on average and 1-2 hours on average.    General Health:  - Sleep: 7-8 hours of sleep on average and sleeps well.  - Hearing: normal hearing bilateral ears.  - Vision: wears glasses, no vision problems and most recent eye exam < 1 year ago.  - Dental: regular dental visits and brushes teeth twice daily.    /GYN Health:  - Follows with GYN: no.   - Menopause: postmenopausal.   - History of STDs: no    Advanced Care Planning:  - Has an advanced directive?: no    - Has a durable medical POA?: no    - ACP document given to patient?: no      Review of Systems   Constitutional:  Negative for chills and fever.   HENT:  Negative for ear pain and sore throat.    Eyes:  Negative for pain and visual disturbance.   Respiratory:  Negative for cough, chest tightness, shortness of breath and wheezing.    Cardiovascular:  Negative for chest pain, palpitations and leg swelling.   Gastrointestinal:  Negative for abdominal pain, constipation, diarrhea, nausea and vomiting.        GERD symptoms   Endocrine: Negative for cold intolerance and heat intolerance.   Genitourinary:  Negative for decreased urine volume, dysuria and hematuria.   Musculoskeletal:  Negative for arthralgias, back pain and myalgias.   Skin:  Negative for color change and rash.   Allergic/Immunologic: Negative for environmental allergies.   Neurological:  Negative for dizziness, seizures, syncope, weakness, light-headedness, numbness and headaches.   Hematological:  Negative for  "adenopathy.   Psychiatric/Behavioral:  Negative for confusion. The patient is not nervous/anxious.    All other systems reviewed and are negative.        Objective     /82   Pulse 67   Temp (!) 97 °F (36.1 °C)   Ht 5' 10\" (1.778 m)   Wt 86.2 kg (190 lb)   SpO2 97%   BMI 27.26 kg/m²     Physical Exam  Vitals and nursing note reviewed.   Constitutional:       General: She is not in acute distress.     Appearance: Normal appearance. She is well-developed. She is not ill-appearing.   HENT:      Head: Normocephalic.   Eyes:      Conjunctiva/sclera: Conjunctivae normal.   Cardiovascular:      Rate and Rhythm: Normal rate and regular rhythm.      Pulses: Normal pulses.           Carotid pulses are 2+ on the right side and 2+ on the left side.       Radial pulses are 2+ on the right side and 2+ on the left side.        Posterior tibial pulses are 2+ on the right side and 2+ on the left side.      Heart sounds: Normal heart sounds. No murmur heard.  Pulmonary:      Effort: Pulmonary effort is normal. No respiratory distress.      Breath sounds: Normal breath sounds. No decreased breath sounds, wheezing, rhonchi or rales.   Abdominal:      General: Abdomen is flat. Bowel sounds are normal. There is no distension.      Palpations: Abdomen is soft.      Tenderness: There is no abdominal tenderness. There is no guarding.   Musculoskeletal:         General: No swelling. Normal range of motion.      Cervical back: Normal range of motion and neck supple.      Right lower leg: No edema.      Left lower leg: No edema.   Skin:     General: Skin is warm and dry.      Capillary Refill: Capillary refill takes less than 2 seconds.   Neurological:      General: No focal deficit present.      Mental Status: She is alert and oriented to person, place, and time.   Psychiatric:         Mood and Affect: Mood normal.         Behavior: Behavior normal.         Thought Content: Thought content normal.         Judgment: Judgment normal. "

## 2024-11-08 NOTE — ASSESSMENT & PLAN NOTE
Since the patient's GERD symptoms are currently uncontrolled she will be trialed on Pepcid 40 mg at bedtime and Protonix 40 mg prior to breakfast.  I did advise her to take the Protonix at least 30 minutes prior to eating in the morning.  Her symptoms can be reassessed at her next office visit.  Orders:    famotidine (PEPCID) 40 MG tablet; Take 1 tablet (40 mg total) by mouth daily at bedtime    pantoprazole (PROTONIX) 40 mg tablet; Take 1 tablet (40 mg total) by mouth daily before breakfast

## 2024-11-08 NOTE — ASSESSMENT & PLAN NOTE
Well-controlled on current regimen.    Orders:    amLODIPine (NORVASC) 10 mg tablet; Take 1 tablet (10 mg total) by mouth daily

## 2024-11-11 ENCOUNTER — TELEPHONE (OUTPATIENT)
Dept: FAMILY MEDICINE CLINIC | Facility: CLINIC | Age: 59
End: 2024-11-11

## 2024-11-11 NOTE — TELEPHONE ENCOUNTER
Please make patient aware that her physical paperwork was completed however, I left the section regarding color vision testing blank as we do not complete color vision testing in the office.

## 2024-11-12 NOTE — TELEPHONE ENCOUNTER
Left detailed message advising patient of paperwork being completed. A mychart message was also sent.

## 2024-11-19 ENCOUNTER — PATIENT MESSAGE (OUTPATIENT)
Dept: GASTROENTEROLOGY | Facility: CLINIC | Age: 59
End: 2024-11-19

## 2024-11-23 ENCOUNTER — ANESTHESIA EVENT (OUTPATIENT)
Dept: ANESTHESIOLOGY | Facility: HOSPITAL | Age: 59
End: 2024-11-23

## 2024-11-23 ENCOUNTER — ANESTHESIA (OUTPATIENT)
Dept: ANESTHESIOLOGY | Facility: HOSPITAL | Age: 59
End: 2024-11-23

## 2025-01-13 ENCOUNTER — TELEPHONE (OUTPATIENT)
Dept: OTHER | Facility: OTHER | Age: 60
End: 2025-01-13

## 2025-01-13 NOTE — TELEPHONE ENCOUNTER
Patient called to confirm her Flu shot appointment for tomorrow. She was unable to do it over the message she received on her phone.

## 2025-01-16 ENCOUNTER — OFFICE VISIT (OUTPATIENT)
Dept: FAMILY MEDICINE CLINIC | Facility: CLINIC | Age: 60
End: 2025-01-16
Payer: COMMERCIAL

## 2025-01-16 VITALS
HEIGHT: 70 IN | OXYGEN SATURATION: 98 % | WEIGHT: 194 LBS | HEART RATE: 67 BPM | SYSTOLIC BLOOD PRESSURE: 120 MMHG | BODY MASS INDEX: 27.77 KG/M2 | TEMPERATURE: 98.5 F | DIASTOLIC BLOOD PRESSURE: 72 MMHG

## 2025-01-16 DIAGNOSIS — R52 BODY ACHES: ICD-10-CM

## 2025-01-16 DIAGNOSIS — J01.40 ACUTE NON-RECURRENT PANSINUSITIS: Primary | ICD-10-CM

## 2025-01-16 DIAGNOSIS — I10 ESSENTIAL HYPERTENSION: ICD-10-CM

## 2025-01-16 LAB
SARS-COV-2 AG UPPER RESP QL IA: NEGATIVE
SL AMB POCT RAPID FLU A: NEGATIVE
SL AMB POCT RAPID FLU B: NEGATIVE
VALID CONTROL: NORMAL

## 2025-01-16 PROCEDURE — 99214 OFFICE O/P EST MOD 30 MIN: CPT | Performed by: NURSE PRACTITIONER

## 2025-01-16 PROCEDURE — 87811 SARS-COV-2 COVID19 W/OPTIC: CPT | Performed by: NURSE PRACTITIONER

## 2025-01-16 PROCEDURE — 87804 INFLUENZA ASSAY W/OPTIC: CPT | Performed by: NURSE PRACTITIONER

## 2025-01-16 RX ORDER — AZITHROMYCIN 250 MG/1
TABLET, FILM COATED ORAL
Qty: 6 TABLET | Refills: 0 | Status: SHIPPED | OUTPATIENT
Start: 2025-01-16 | End: 2025-01-21

## 2025-01-16 NOTE — PROGRESS NOTES
Name: Yohana Sarmiento      : 1965      MRN: [id number]  Encounter Provider: EDWARD Gambino  Encounter Date: 2025   Encounter department: [facility]  :  Assessment & Plan  Acute non-recurrent pansinusitis  Azithromycin was ordered for treatment of acute sinusitis.  Orders:  •  azithromycin (Zithromax) 250 mg tablet; Take 2 tablets (500 mg total) by mouth daily for 1 day, THEN 1 tablet (250 mg total) daily for 4 days.    Body aches    Orders:  •  POCT Rapid Covid Ag  •  POCT rapid flu A and B    Essential hypertension  Well-controlled on current regimen.             Depression Screening and Follow-up Plan: Patient was screened for depression during today's encounter. They screened negative with a PHQ-2 score of 0.      History of Present Illness     URI symptoms: Patient reports over the past 5 days she has been experiencing symptoms of rhinorrhea, nasal congestion, myalgias, sore throat, and fever.  She denies any shortness of breath.  Rapid COVID and influenza tests were both negative in the office today.    Hypertension: Well-controlled on current regimen.  Patient denies lightheadedness, dizziness, frequent headaches, or vision changes.      Review of Systems   Constitutional:  Positive for fatigue and fever (intermittent). Negative for chills.   HENT:  Positive for congestion, postnasal drip, rhinorrhea and sore throat. Negative for ear pain.    Eyes:  Negative for pain and visual disturbance.   Respiratory:  Negative for cough, chest tightness, shortness of breath and wheezing.    Cardiovascular:  Negative for chest pain, palpitations and leg swelling.   Gastrointestinal:  Negative for abdominal pain, constipation, diarrhea, nausea and vomiting.   Endocrine: Negative for cold intolerance and heat intolerance.   Genitourinary:  Negative for decreased urine volume, dysuria and hematuria.   Musculoskeletal:  Positive for myalgias. Negative for arthralgias and back pain.  "  Skin:  Negative for color change and rash.   Allergic/Immunologic: Negative for environmental allergies.   Neurological:  Negative for dizziness, seizures, syncope, weakness, light-headedness, numbness and headaches.   Hematological:  Negative for adenopathy.   Psychiatric/Behavioral:  Negative for confusion. The patient is not nervous/anxious.    All other systems reviewed and are negative.      Objective   /72 (BP Location: Right arm, Patient Position: Sitting, Cuff Size: Standard)   Pulse 67   Temp 98.5 °F (36.9 °C) (Oral)   Ht 5' 10\" (1.778 m)   Wt 88 kg (194 lb)   SpO2 98%   BMI 27.84 kg/m²      Physical Exam  Vitals and nursing note reviewed.   Constitutional:       General: She is not in acute distress.     Appearance: Normal appearance. She is well-developed. She is not ill-appearing.   HENT:      Head: Normocephalic.      Right Ear: Hearing normal. A middle ear effusion (small) is present.      Left Ear: Hearing normal. A middle ear effusion (small) is present.      Mouth/Throat:      Pharynx: Postnasal drip present. No pharyngeal swelling, oropharyngeal exudate, posterior oropharyngeal erythema or uvula swelling.      Tonsils: No tonsillar exudate or tonsillar abscesses.   Eyes:      Conjunctiva/sclera: Conjunctivae normal.   Cardiovascular:      Rate and Rhythm: Normal rate and regular rhythm.      Pulses: Normal pulses.           Carotid pulses are 2+ on the right side and 2+ on the left side.       Radial pulses are 2+ on the right side and 2+ on the left side.        Posterior tibial pulses are 2+ on the right side and 2+ on the left side.      Heart sounds: Normal heart sounds. No murmur heard.  Pulmonary:      Effort: Pulmonary effort is normal. No respiratory distress.      Breath sounds: Normal breath sounds. No decreased breath sounds, wheezing, rhonchi or rales.   Abdominal:      General: Abdomen is flat. Bowel sounds are normal. There is no distension.      Palpations: Abdomen is " soft.      Tenderness: There is no abdominal tenderness. There is no guarding.   Musculoskeletal:         General: No swelling. Normal range of motion.      Cervical back: Normal range of motion and neck supple.      Right lower leg: No edema.      Left lower leg: No edema.   Skin:     General: Skin is warm and dry.      Capillary Refill: Capillary refill takes less than 2 seconds.   Neurological:      General: No focal deficit present.      Mental Status: She is alert and oriented to person, place, and time.   Psychiatric:         Mood and Affect: Mood normal.         Behavior: Behavior normal.         Thought Content: Thought content normal.         Judgment: Judgment normal.

## 2025-01-16 NOTE — LETTER
January 16, 2025     Patient: Yohana Sarmiento  YOB: 1965  Date of Visit: 1/16/2025      To Whom it May Concern:    Yohana Sarmiento is under my professional care. Yohana was seen in my office on 1/16/2025. Yohana is excused from work from 1/13/2025-1/19/2025 as she is being treated for a medical condition.  She may return to work on 1/20/2025.    If you have any questions or concerns, please don't hesitate to call.         Sincerely,          EDWARD Gambino        CC: No Recipients

## 2025-01-16 NOTE — ASSESSMENT & PLAN NOTE
Azithromycin was ordered for treatment of acute sinusitis.  Orders:  •  azithromycin (Zithromax) 250 mg tablet; Take 2 tablets (500 mg total) by mouth daily for 1 day, THEN 1 tablet (250 mg total) daily for 4 days.

## 2025-01-23 DIAGNOSIS — K21.9 GASTROESOPHAGEAL REFLUX DISEASE, UNSPECIFIED WHETHER ESOPHAGITIS PRESENT: ICD-10-CM

## 2025-01-23 RX ORDER — PANTOPRAZOLE SODIUM 40 MG/1
TABLET, DELAYED RELEASE ORAL
Qty: 90 TABLET | Refills: 1 | Status: SHIPPED | OUTPATIENT
Start: 2025-01-23

## 2025-02-13 ENCOUNTER — RA CDI HCC (OUTPATIENT)
Dept: OTHER | Facility: HOSPITAL | Age: 60
End: 2025-02-13

## 2025-02-13 NOTE — PROGRESS NOTES
HCC coding opportunities       Chart reviewed, no opportunity found: CHART REVIEWED, NO OPPORTUNITY FOUND   This is a reminder to address (resolve/update/assess) ALL HCC (risk adjustment) codes as found on active problem list for 2025 as patient scores reset to zero KARTHIKEYAN.     Patients Insurance        Commercial Insurance: [employer]

## 2025-02-15 PROBLEM — J01.40 ACUTE NON-RECURRENT PANSINUSITIS: Status: RESOLVED | Noted: 2025-01-16 | Resolved: 2025-02-15

## 2025-03-26 ENCOUNTER — TELEPHONE (OUTPATIENT)
Age: 60
End: 2025-03-26

## 2025-03-26 NOTE — TELEPHONE ENCOUNTER
Patient stated she faxed a form for disability benefits for pcp to complete; it was faxed about 10 minutes ago to [phone]. I advised I did not see it as of yet but to give it a little bit of time. Faxes are  and reviewed. I advised her of office hours; she will either call back today or requests to call her when form has been received; [phone].

## 2025-03-28 ENCOUNTER — OFFICE VISIT (OUTPATIENT)
Dept: FAMILY MEDICINE CLINIC | Facility: CLINIC | Age: 60
End: 2025-03-28

## 2025-03-28 VITALS
OXYGEN SATURATION: 96 % | BODY MASS INDEX: 27.77 KG/M2 | DIASTOLIC BLOOD PRESSURE: 68 MMHG | WEIGHT: 194 LBS | SYSTOLIC BLOOD PRESSURE: 118 MMHG | HEIGHT: 70 IN | HEART RATE: 68 BPM

## 2025-03-28 DIAGNOSIS — M17.0 PRIMARY OSTEOARTHRITIS OF BOTH KNEES: Primary | ICD-10-CM

## 2025-03-28 PROCEDURE — 99213 OFFICE O/P EST LOW 20 MIN: CPT | Performed by: PHYSICIAN ASSISTANT

## 2025-03-28 NOTE — ASSESSMENT & PLAN NOTE
Per patient history and review of medical records patient does have mild to moderate bilateral knee arthritis and would benefit from seeing orthopedics, injections, anti-inflammatories.  Patient currently has no insurance and is declining medication at this time.  I did complete the form stating that she is employable.  Orders:  •  Ambulatory Referral to Orthopedic Surgery; Future

## 2025-03-28 NOTE — TELEPHONE ENCOUNTER
Patient called to confirm receipt of fax for disability.  Relayed message - Received form placed on [employer].    Pt stated the form is due today 3/28.  Is kindly asking if PCP can complete, sign, send as attached via my chart message?  If unable to complete today, pt is requesting an approximate date of when form will be completed.  Kindly respond via my chart message.  Thank you.

## 2025-03-28 NOTE — PROGRESS NOTES
"Name: Yohana Sarmiento      : 1965      MRN: [id number]  Encounter Provider: Carla Arzola PA-C  Encounter Date: 3/28/2025   Encounter department: [facility]  :  Assessment & Plan  Primary osteoarthritis of both knees  Per patient history and review of medical records patient does have mild to moderate bilateral knee arthritis and would benefit from seeing orthopedics, injections, anti-inflammatories.  Patient currently has no insurance and is declining medication at this time.  I did complete the form stating that she is employable.  Orders:  •  Ambulatory Referral to Orthopedic Surgery; Future           History of Present Illness   Patient presents with:  Follow-up: Patient present today for a follow up on her disability paperwork.    Pt. states has had issues with her knees since  and had injections which did not help when she lived in [place] at the time. She works as a CNA. Pt. Wants to only work prn.   Pt. has cancelled her ortho apt 6 times in the past four months.     I see x-rays from 2019 that show mild and moderate arthritis.    States that no injections and no medication she has ever tried has ever helped and she does not want to try any more.      Review of Systems   Constitutional: Negative.    HENT: Negative.     Eyes: Negative.    Respiratory: Negative.     Cardiovascular: Negative.    Gastrointestinal: Negative.    Endocrine: Negative.    Genitourinary: Negative.    Musculoskeletal: Negative.         Moo knee pain   Skin: Negative.    Allergic/Immunologic: Negative.    Neurological: Negative.    Hematological: Negative.    Psychiatric/Behavioral: Negative.         Objective   /68 (BP Location: Left arm, Patient Position: Sitting, Cuff Size: Standard)   Pulse 68   Ht 5' 10\" (1.778 m)   Wt 88 kg (194 lb)   SpO2 96%   BMI 27.84 kg/m²      Physical Exam  Vitals and nursing note reviewed.   Constitutional:       Appearance: Normal appearance. She is " well-developed.   HENT:      Head: Normocephalic and atraumatic.   Eyes:      General: Lids are normal.      Conjunctiva/sclera: Conjunctivae normal.      Pupils: Pupils are equal, round, and reactive to light.   Cardiovascular:      Rate and Rhythm: Normal rate and regular rhythm.      Heart sounds: No murmur heard.  Pulmonary:      Effort: Pulmonary effort is normal.      Breath sounds: Normal breath sounds.   Musculoskeletal:      Right knee: Crepitus present.      Left knee: Swelling present. Tenderness present.      Comments: PT ambulating w/o assistive device and in no apparent discomfort   Skin:     General: Skin is warm and dry.   Neurological:      General: No focal deficit present.      Mental Status: She is alert.      Coordination: Coordination is intact.   Psychiatric:         Mood and Affect: Mood normal.         Behavior: Behavior normal. Behavior is cooperative.         Thought Content: Thought content normal.         Judgment: Judgment normal.

## 2025-04-02 ENCOUNTER — TELEPHONE (OUTPATIENT)
Age: 60
End: 2025-04-02

## 2025-04-03 ENCOUNTER — OFFICE VISIT (OUTPATIENT)
Dept: OBGYN CLINIC | Facility: MEDICAL CENTER | Age: 60
End: 2025-04-03

## 2025-04-03 VITALS — BODY MASS INDEX: 27.77 KG/M2 | WEIGHT: 194 LBS | HEIGHT: 70 IN

## 2025-04-03 DIAGNOSIS — M25.562 CHRONIC PAIN OF BOTH KNEES: Primary | ICD-10-CM

## 2025-04-03 DIAGNOSIS — M17.0 PRIMARY OSTEOARTHRITIS OF BOTH KNEES: ICD-10-CM

## 2025-04-03 DIAGNOSIS — M25.561 CHRONIC PAIN OF BOTH KNEES: Primary | ICD-10-CM

## 2025-04-03 DIAGNOSIS — G89.29 CHRONIC PAIN OF BOTH KNEES: Primary | ICD-10-CM

## 2025-04-03 RX ORDER — ROPIVACAINE HYDROCHLORIDE 2 MG/ML
4 INJECTION, SOLUTION EPIDURAL; INFILTRATION; PERINEURAL
Status: COMPLETED | OUTPATIENT
Start: 2025-04-03 | End: 2025-04-03

## 2025-04-03 RX ORDER — TRIAMCINOLONE ACETONIDE 40 MG/ML
40 INJECTION, SUSPENSION INTRA-ARTICULAR; INTRAMUSCULAR
Status: COMPLETED | OUTPATIENT
Start: 2025-04-03 | End: 2025-04-03

## 2025-04-03 RX ADMIN — TRIAMCINOLONE ACETONIDE 40 MG: 40 INJECTION, SUSPENSION INTRA-ARTICULAR; INTRAMUSCULAR at 10:30

## 2025-04-03 RX ADMIN — ROPIVACAINE HYDROCHLORIDE 4 ML: 2 INJECTION, SOLUTION EPIDURAL; INFILTRATION; PERINEURAL at 10:30

## 2025-04-03 NOTE — LETTER
April 3, 2025     Patient: Yohana Sarmiento  YOB: 1965  Date of Visit: 4/3/2025      To Whom it May Concern:    Yohana Sarmiento is under my professional care. Yohana was seen in my office on 4/3/2025. Yohana is treating for chronic severe knee pain and osteoarthritis.      If you have any questions or concerns, please don't hesitate to call.         Sincerely,          Gopi Ayers MD        CC: No Recipients

## 2025-04-03 NOTE — PROGRESS NOTES
Assessment/Plan:    Diagnoses and all orders for this visit:    Chronic pain of both knees  -     Large joint arthrocentesis: L knee  -     Ambulatory Referral to Orthopedic Surgery; Future    Primary osteoarthritis of both knees  -     Ambulatory Referral to Orthopedic Surgery  -     Large joint arthrocentesis: L knee  -     Ambulatory Referral to Orthopedic Surgery; Future    Left knee CSI provided today.  She's had CSI in the past with only about 1-2 months benefit.  Once she obtains insurance she would like to schedule with surgeon to discuss TKA  Reviewed previous Xrays  SELF PAY    Return if symptoms worsen or fail to improve.      Subjective:   Patient ID: Yohana Sarmiento is a 59 y.o. female.    New patient presents for chronic bilateral knee pain and osteoarthritis previously seen on x-rays showing severe degenerative changes.  She has received injections in the past.  She is hoping to obtain health insurance.  She currently works and is hoping to cut down to part-time or per [place].        Review of Systems    The following portions of the patient's chart were reviewed and updated as appropriate:   Allergy:  No Known Allergies    Medications:    Current Outpatient Medications:     amLODIPine (NORVASC) 10 mg tablet, Take 1 tablet (10 mg total) by mouth daily, Disp: 90 tablet, Rfl: 1    Diclofenac Sodium (VOLTAREN) 1 %, Apply 2 g topically 4 (four) times a day, Disp: 20 g, Rfl: 0    famotidine (PEPCID) 40 MG tablet, Take 1 tablet (40 mg total) by mouth daily at bedtime, Disp: 90 tablet, Rfl: 1    methocarbamol (ROBAXIN) 500 mg tablet, Take 1 tablet (500 mg total) by mouth 2 (two) times a day, Disp: 20 tablet, Rfl: 0    naproxen (NAPROSYN) 500 mg tablet, Take 1 tablet (500 mg total) by mouth 2 (two) times a day with meals, Disp: 30 tablet, Rfl: 0    pantoprazole (PROTONIX) 40 mg tablet, TAKE 1 TABLET(40 MG) BY MOUTH DAILY BEFORE BREAKFAST, Disp: 90 tablet, Rfl: 1    rosuvastatin (CRESTOR) 5 mg tablet, TAKE 1  "TABLET(5 MG) BY MOUTH DAILY, Disp: 90 tablet, Rfl: 0    Patient Active Problem List   Diagnosis    Chronic bilateral low back pain without sciatica    Essential hypertension    GERD (gastroesophageal reflux disease)    Hyperlipidemia    Osteoarthritis    Cyst of uterus    Primary osteoarthritis of both knees       Objective:  Ht 5' 10\" (1.778 m)   Wt 88 kg (194 lb)   BMI 27.84 kg/m²     Right Knee Exam     Tenderness   The patient is experiencing tenderness in the medial joint line.    Range of Motion   Extension:  normal     Other   Erythema: absent  Swelling: moderate  Effusion: effusion present      Left Knee Exam     Tenderness   The patient is experiencing tenderness in the medial joint line.    Range of Motion   Extension:  normal     Other   Erythema: absent  Swelling: moderate  Effusion: effusion present          Observations   Left Knee   Positive for effusion.     Right Knee   Positive for effusion.       Physical Exam  Musculoskeletal:      Right knee: Effusion present.      Left knee: Effusion present.           Neurologic Exam    Large joint arthrocentesis: L knee  Universal Protocol:  Consent: Verbal consent obtained.  Risks and benefits: risks, benefits and alternatives were discussed  Consent given by: patient  Time out: Immediately prior to procedure a \"time out\" was called to verify the correct patient, procedure, equipment, support staff and site/side marked as required.  Timeout called at: 4/3/2025 10:33 AM.  Patient understanding: patient states understanding of the procedure being performed  Test results: test results available and properly labeled  Site marked: the operative site was marked  Patient identity confirmed: verbally with patient  Supporting Documentation  Indications: pain   Procedure Details  Location: knee - L knee  Preparation: Patient was prepped and draped in the usual sterile fashion  Needle size: 22 G  Ultrasound guidance: no  Approach: anterolateral  Medications " administered: 40 mg triamcinolone acetonide 40 mg/mL; 4 mL ropivacaine 0.2 %    Patient tolerance: patient tolerated the procedure well with no immediate complications  Dressing:  Sterile dressing applied    No erythema of knee(s)        I have personally reviewed pertinent films in PACS. and I have personally reviewed the written report of the pertinent studies.   Xrays B/L Knees  severe bilateral tricompartmental osteoarthritis most   pronounced in the medial compartments             Past Medical History:   Diagnosis Date    Arthritis     Hyperlipemia     Hypertension        Past Surgical History:   Procedure Laterality Date    TUBAL LIGATION  1994       Social History     Socioeconomic History    Marital status: Single     Spouse name: Not on file    Number of children: Not on file    Years of education: Not on file    Highest education level: Not on file   Occupational History    Not on file   Tobacco Use    Smoking status: Never    Smokeless tobacco: Never   Vaping Use    Vaping status: Never Used   Substance and Sexual Activity    Alcohol use: Never    Drug use: Never    Sexual activity: Not on file   Other Topics Concern    Not on file   Social History Narrative    Not on file     Social Drivers of Health     Financial Resource Strain: Not on file   Food Insecurity: No Food Insecurity (1/3/2024)    Received from [facility], [facility], and [facility], [facility], [facility], and [facility]    Hunger Vital Sign     Worried About Running Out of Food in the Last Year: Never true     Ran Out of Food in the Last Year: Never true   Transportation Needs: No Transportation Needs (1/3/2024)    Received from [facility], [facility], and [facility], [facility], [facility], and [facility]    PRAPARE - Transportation     Lack of Transportation (Medical): No      Lack of Transportation (Non-Medical): No   Physical Activity: Not on file   Stress: Not on file   Social Connections: Not on file   Intimate Partner Violence: Not on file   Housing Stability: Low Risk  (1/3/2024)    Received from [facility], [facility], and [facility], [facility], [facility], and [facility]    Housing Stability Vital Sign     Unable to Pay for Housing in the Last Year: No     Number of Places Lived in the Last Year: 1     Unstable Housing in the Last Year: No       Family History   Problem Relation Age of Onset    Hypertension Mother     Aneurysm Father     Hypertension Sister     Hyperlipidemia Sister

## 2025-04-03 NOTE — PATIENT INSTRUCTIONS
You may use Advil (ibuprofen) 400-600mg every 6 hours or at least twice per day (OR Aleve (naproxen) 250-500mg every 12 hours as needed for pain and inflammation).  However do not mix or take other NSAIDs together such as Advil, Motrin, ibuprofen, Celebrex, naproxen, diclofenac or Aleve.    You may also take Tylenol (acetaminophen) together with Advil (ibuprofen) or Aleve (naproxen) as this is safe and can help decrease your pain levels.  The dosing for Tylenol is 500mg every 4-6 hours as needed OR max 1,000mg per dose up to 3 times per day for a total of 3,000mg per day  *Check with your primary care physician to see if these medications are safe to take and to make sure they do not interfere with your other medications and medical issues.          In order to minimize further degenerative changes and pain from arthritis we recommend maintaining an active lifestyle and continually trying to maintain a healthy weight / BMI (Body Mass Index).  If you are interested we can refer you to Weight Management to help with weight loss.  I recommended avoiding any tobacco use.  On rainy days and cold days you may have worsening pain than baseline.    You may use Advil (ibuprofen) 400-600mg every 6 hours or at least twice per day (OR Aleve (naproxen) 250-500mg every 12 hours as needed for pain and inflammation).  However do not mix or take other NSAIDs together such as Advil, Motrin, ibuprofen, Celebrex, naproxen, diclofenac or Aleve.    You may also take Tylenol (acetaminophen) together with Advil (ibuprofen) or Aleve (naproxen) as this is safe and can help decrease your pain levels.  The dosing for Tylenol is 500mg every 4-6 hours as needed OR max 1,000mg per dose up to 3 times per day for a total of 3,000mg per day  *Check with your primary care physician to see if these medications are safe to take and to make sure they do not interfere with your other medications and medical issues.            [email or web address] for more  information     Steroid Joint Injection   AMBULATORY CARE:   What you need to know about steroid joint injection:  A steroid joint injection is a procedure to inject steroid medicine into a joint. Steroid medicine decreases pain and inflammation. The injection may also contain an anesthetic (numbing medicine) to decrease pain. It may be done to treat conditions such as arthritis, gout, or carpal tunnel syndrome. The injections may be given in your knee, ankle, shoulder, elbow, wrist, or ankle.   How to prepare for steroid joint injection:  Your healthcare provider will talk to you about how to prepare for this procedure. He will tell you what medicines to take or not take on the day of your procedure. You may need to stop taking blood thinners several days before your procedure.   What will happen during steroid joint injection:  You may be given local anesthesia to numb the area where the injection will be given. With local anesthesia, you may still feel pressure during the procedure, but you should not feel any pain. Your healthcare provider may use ultrasound or fluoroscopy (a type of x-ray) to guide the needle to the right area. He will then inject the steroid into your joint. A bandage will be placed on the injection site.   What will happen after steroid joint injection:  You may have redness, warmth, or sweating in your face and chest right after the steroid injection. Steroids can affect blood sugar levels. If you have diabetes, you should check your blood sugars closely in the first 24 hours after your procedure.   Risks of steroid joint injection:  You may get an infection in your joint. The injection may also cause more pain during the first 24 to 36 hours. You may need more than one injection to feel pain relief. The skin near the injection site may be damaged and become discolored or indented. This can happen if the steroid is placed too close to your skin. A tendon near the injection site may rupture  or a nerve can be damaged.  Contact your healthcare provider if:   You have fever or chills.     You have redness or swelling in the injection site.     You have more pain than usual in your joint for more than 72 hours.     You have questions or concerns about your condition or care.  Medicines:   Pain medicine  may be given. Ask how to take this medicine safely.     Take your medicine as directed.  Contact your healthcare provider if you think your medicine is not helping or if you have side effects. Tell him or her if you are allergic to any medicine. Keep a list of the medicines, vitamins, and herbs you take. Include the amounts, and when and why you take them. Bring the list or the pill bottles to follow-up visits. Carry your medicine list with you in case of an emergency.  Self-care:   Leave the bandage on for 8 to 12 hours.  Care for your wound as directed.    Rest the area  as directed. You may need to decrease weight on certain joints, such as the knee, for a period of time. Ask when you can return to your daily activities.     Elevate  your limb where the steroid injection was given. Elevate the limb above the level of your heart as often as you can. This will help decrease swelling and pain. Prop your limb on pillows or blankets to keep it elevated comfortably.     Apply ice  on your joint for 15 to 20 minutes every hour or as directed. Use an ice pack, or put crushed ice in a plastic bag. Cover it with a towel. Ice helps prevent tissue damage and decreases swelling and pain.  Follow up with your healthcare provider as directed:  Write down your questions so you remember to ask them during your visits.   © 2017 [employer] Information is for End User's use only and may not be sold, redistributed or otherwise used for commercial purposes. All illustrations and images included in CareNotes® are the copyrighted property of [employer] or [employer].  The above information is  an  only. It is not intended as medical advice for individual conditions or treatments. Talk to your doctor, nurse or pharmacist before following any medical regimen to see if it is safe and effective for you.      Arthritis   AMBULATORY CARE:   Arthritis  is a disease that causes inflammation in one or more joints. There are many types of arthritis, such as osteoarthritis, rheumatoid arthritis, and septic arthritis. Some types cause inflammation in the joints. Other types wear away the cartilage between joints. This makes the bones of the joint rub together when you move the joint. Your symptoms may be constant, or symptoms may come and go. Arthritis often gets worse over time and can cause permanent joint damage.  Common signs and symptoms of arthritis:   Pain, swelling, or stiffness in the joint    Limited range of motion in the joint    Warmth or redness over the joint    Tenderness when you touch the joint    Stiff joints in the morning that loosen with movement    A creaking or grinding sound when you move the joint    Fever  Seek care immediately if:   You have a fever and severe joint pain or swelling.     You cannot move the affected joint.     You have severe joint pain you cannot tolerate.  Contact your healthcare provider if:   Your pain or swelling does not get better with treatment.    You have questions or concerns about your condition or care.  Treatment  will depend on the type of arthritis you have and if it is severe. You may need any of the following:  Acetaminophen  decreases pain and fever. It is available without a doctor's order. Ask how much to take and how often to take it. Follow directions. Acetaminophen can cause liver damage if not taken correctly.    NSAIDs , such as ibuprofen, help decrease swelling, pain, and fever. This medicine is available with or without a doctor's order. NSAIDs can cause stomach bleeding or kidney problems in certain people. If you take blood  thinner medicine, always ask your healthcare provider if NSAIDs are safe for you. Always read the medicine label and follow directions.    Steroid medicine  helps reduce swelling and pain.     Surgery  may be needed to repair or replace a damaged joint.  Manage arthritis:   Rest your painful joint so it can heal.  Your healthcare provider may recommend crutches or a walker if the affected joint is in a leg.     Apply ice or heat to the joint.  Both can help decrease swelling and pain. Ice may also help prevent tissue damage. Use an ice pack, or put crushed ice in a plastic bag. Cover it with a towel and place it on your joint for 15 to 20 minutes every hour or as directed. You can apply heat for 20 minutes every 2 hours. Heat treatment includes hot packs or heat lamps.    Elevate your joint.  Elevation helps reduce swelling and pain. Raise your joint above the level of your heart as often as you can. Prop your painful joint on pillows to keep it above your heart comfortably.    Go to physical or occupational therapy as directed.  A physical therapist can teach you exercises to improve flexibility and range of motion. You may also be shown non-weight-bearing exercises that are safe for your joints, such as swimming. Exercise can help keep your joints flexible and reduce pain. An occupational therapist can help you learn to do your daily activities when your joints are stiff or sore.    Maintain a healthy weight.  Extra weight puts increased pressure on your joints. Ask your healthcare provider what you should weigh. If you need to lose weight, he can help you create a weight loss program. Weight loss can help reduce pain and increase your ability to do your activities. The amount of exercise you do may vary each day, depending on your symptoms.    Wear flat or low-heeled shoes.  This will help decrease pain and reduce pressure on your ankle, knee, and hip joints.    Use support devices as directed.  You may be given  splints to wear on your hands to help your joints rest and to decrease inflammation. While you sleep, use a pillow that is firm enough to support your neck and head.  Other equipment  that may help you move and prevent falls:  Orthotic shoes or insoles  help support your feet when you walk.    Crutches, a cane, or a walker  may help decrease your risk for falling. They also decrease stress on affected joints.     Devices to prevent falls  include raised toilet seats and bathtub bars to help you get up from sitting. Handrails can be placed in areas where you need balance and support.  Follow up with your healthcare provider or rheumatologist as directed:  Write down your questions so you remember to ask them during your visits.  © 2017 [employer] Information is for End User's use only and may not be sold, redistributed or otherwise used for commercial purposes. All illustrations and images included in CareNotes® are the copyrighted property of [employer] or [employer].  The above information is an  only. It is not intended as medical advice for individual conditions or treatments. Talk to your doctor, nurse or pharmacist before following any medical regimen to see if it is safe and effective for you.

## 2025-05-02 DIAGNOSIS — I10 ESSENTIAL HYPERTENSION: ICD-10-CM

## 2025-05-02 RX ORDER — AMLODIPINE BESYLATE 10 MG/1
10 TABLET ORAL DAILY
Qty: 90 TABLET | Refills: 1 | Status: SHIPPED | OUTPATIENT
Start: 2025-05-02

## 2025-05-14 DIAGNOSIS — K21.9 GASTROESOPHAGEAL REFLUX DISEASE, UNSPECIFIED WHETHER ESOPHAGITIS PRESENT: ICD-10-CM

## 2025-05-14 RX ORDER — PANTOPRAZOLE SODIUM 40 MG/1
TABLET, DELAYED RELEASE ORAL
Qty: 90 TABLET | Refills: 0 | OUTPATIENT
Start: 2025-05-14

## 2025-05-28 ENCOUNTER — OFFICE VISIT (OUTPATIENT)
Dept: OBGYN CLINIC | Facility: MEDICAL CENTER | Age: 60
End: 2025-05-28
Payer: MEDICARE

## 2025-05-28 VITALS — BODY MASS INDEX: 27.63 KG/M2 | WEIGHT: 193 LBS | HEIGHT: 70 IN

## 2025-05-28 DIAGNOSIS — M17.0 PRIMARY OSTEOARTHRITIS OF BOTH KNEES: ICD-10-CM

## 2025-05-28 DIAGNOSIS — M25.562 CHRONIC PAIN OF BOTH KNEES: Primary | ICD-10-CM

## 2025-05-28 DIAGNOSIS — G89.29 CHRONIC PAIN OF BOTH KNEES: Primary | ICD-10-CM

## 2025-05-28 DIAGNOSIS — M25.561 CHRONIC PAIN OF BOTH KNEES: Primary | ICD-10-CM

## 2025-05-28 PROCEDURE — 99213 OFFICE O/P EST LOW 20 MIN: CPT | Performed by: EMERGENCY MEDICINE

## 2025-05-28 PROCEDURE — 20610 DRAIN/INJ JOINT/BURSA W/O US: CPT | Performed by: EMERGENCY MEDICINE

## 2025-05-28 RX ORDER — ROPIVACAINE HYDROCHLORIDE 2 MG/ML
4 INJECTION, SOLUTION EPIDURAL; INFILTRATION; PERINEURAL
Status: COMPLETED | OUTPATIENT
Start: 2025-05-28 | End: 2025-05-28

## 2025-05-28 RX ORDER — METHYLPREDNISOLONE ACETATE 40 MG/ML
4 INJECTION, SUSPENSION INTRA-ARTICULAR; INTRALESIONAL; INTRAMUSCULAR; SOFT TISSUE
Status: COMPLETED | OUTPATIENT
Start: 2025-05-28 | End: 2025-05-28

## 2025-05-28 RX ADMIN — METHYLPREDNISOLONE ACETATE 4 ML: 40 INJECTION, SUSPENSION INTRA-ARTICULAR; INTRALESIONAL; INTRAMUSCULAR; SOFT TISSUE at 10:00

## 2025-05-28 RX ADMIN — ROPIVACAINE HYDROCHLORIDE 4 ML: 2 INJECTION, SOLUTION EPIDURAL; INFILTRATION; PERINEURAL at 10:00

## 2025-05-28 NOTE — ASSESSMENT & PLAN NOTE
Severe OA  Orders:    Injection Procedure Prior Authorization; Future    Large joint arthrocentesis: R knee    Ambulatory Referral to Physical Therapy; Future

## 2025-05-28 NOTE — PROGRESS NOTES
"Name: Yohana Sarmiento      : 1965      MRN: [id number]  Encounter Provider: Gopi Ayers MD  Encounter Date: 2025   Encounter department: [facility]  :  Assessment & Plan  Chronic pain of both knees  Severe OA  Orders:    Injection Procedure Prior Authorization; Future    Large joint arthrocentesis: R knee    Ambulatory Referral to Physical Therapy; Future    Primary osteoarthritis of both knees  Severe OA  Orders:    Injection Procedure Prior Authorization; Future    Large joint arthrocentesis: R knee    Ambulatory Referral to Physical Therapy; Future    B/L Knee VISCOSUPPLEMENTATION:  Patient has failed conservative treatment including:  NSAIDs   Tylenol   Home exercises   Hx Physical therapy   Weight loss  Assistive devices.  Patient has failed corticosteroid injection  Patient is symptomatic and pain interferes with ADLs/sleep  Pain score 9/10      Return for Visco B/L knees 4-6 weeks from today.      Subjective:     History of Present Illness   Yohana returns with about 5 weeks of benefit from Left knee CSI    Initial note:  New patient presents for chronic bilateral knee pain and osteoarthritis previously seen on x-rays showing severe degenerative changes.  She has received injections in the past.  She is hoping to obtain health insurance.  She currently works and is hoping to cut down to part-time or per [place].          Review of Systems    The following portions of the patient's chart were reviewed and updated as appropriate:   Allergy:  Allergies[1]    Medications:  Current Medications[2]    Problem List[3]    Objective:  Objective   Ht 5' 10\" (1.778 m)   Wt 87.5 kg (193 lb)   BMI 27.69 kg/m²         Left Knee Exam     Other   Erythema: absent            Physical Exam      Neurologic Exam    Large joint arthrocentesis: R knee    Performed by: Mattie Pickett DO  Authorized by: Gopi Ayers MD    Universal Protocol:  Consent: Verbal consent obtained  Risks " "and benefits: risks, benefits and alternatives were discussed  Consent given by: patient  Time out: Immediately prior to procedure a \"time out\" was called to verify the correct patient, procedure, equipment, support staff and site/side marked as required.  Timeout called at: 5/28/2025 10:23 AM.  Patient understanding: patient states understanding of the procedure being performed  Test results: test results available and properly labeled  Site marked: the operative site was marked  Patient identity confirmed: verbally with patient  Supporting Documentation  Indications: pain     Is this a Visco injection? NoProcedure Details  Location: knee - R knee  Preparation: Patient was prepped and draped in the usual sterile fashion  Needle size: 22 G  Ultrasound guidance: no  Approach: anterolateral  Medications administered: 4 mL ropivacaine 0.2 %; 4 mL methylPREDNISolone acetate 40 mg/mL    Patient tolerance: patient tolerated the procedure well with no immediate complications  Dressing:  Sterile dressing applied    No erythema of knee(s)        I have personally reviewed the written report of the pertinent studies.   XR KNEE 4+ VW LEFT INJURY     INDICATION: pain.     COMPARISON: None     FINDINGS:     No acute fracture or dislocation.     No joint effusion.     Severe tricompartmental osteoarthritis, evidenced by full-thickness articular cartilage loss, marginal osteophytes, and subchondral sclerosis and cysts.     No lytic or blastic osseous lesion.     Unremarkable soft tissues.     IMPRESSION:     No acute osseous abnormality.     Degenerative changes as described.          Past Medical History[4]    Past Surgical History[5]    Social History     Socioeconomic History    Marital status: Single     Spouse name: Not on file    Number of children: Not on file    Years of education: Not on file    Highest education level: Not on file   Occupational History    Not on file   Tobacco Use    Smoking status: Never    Smokeless " tobacco: Never   Vaping Use    Vaping status: Never Used   Substance and Sexual Activity    Alcohol use: Never    Drug use: Never    Sexual activity: Not on file   Other Topics Concern    Not on file   Social History Narrative    Not on file     Social Drivers of Health     Financial Resource Strain: Not on file   Food Insecurity: No Food Insecurity (1/3/2024)    Received from [facility], [facility], and [facility]    Hunger Vital Sign     Within the past 12 months, you worried that your food would run out before you got the money to buy more.: Never true     Within the past 12 months, the food you bought just didn't last and you didn't have money to get more.: Never true   Transportation Needs: No Transportation Needs (1/3/2024)    Received from [facility], [facility], and [facility]    PRAPARE - Transportation     Lack of Transportation (Medical): No     Lack of Transportation (Non-Medical): No   Physical Activity: Not on file   Stress: Not on file   Social Connections: Not on file   Intimate Partner Violence: Not on file   Housing Stability: Low Risk  (1/3/2024)    Received from [facility], [facility], and [facility]    Housing Stability Vital Sign     Unable to Pay for Housing in the Last Year: No     Number of Places Lived in the Last Year: 1     Unstable Housing in the Last Year: No       Family History[6]           [1] No Known Allergies  [2]   Current Outpatient Medications:     amLODIPine (NORVASC) 10 mg tablet, TAKE 1 TABLET(10 MG) BY MOUTH DAILY, Disp: 90 tablet, Rfl: 1    Diclofenac Sodium (VOLTAREN) 1 %, Apply 2 g topically 4 (four) times a day, Disp: 20 g, Rfl: 0    famotidine (PEPCID) 40 MG tablet, Take 1 tablet (40 mg total) by mouth daily at bedtime, Disp: 90 tablet, Rfl: 1    methocarbamol (ROBAXIN) 500 mg tablet, Take 1 tablet (500 mg total) by mouth 2 (two) times a day, Disp:  20 tablet, Rfl: 0    naproxen (NAPROSYN) 500 mg tablet, Take 1 tablet (500 mg total) by mouth 2 (two) times a day with meals, Disp: 30 tablet, Rfl: 0    pantoprazole (PROTONIX) 40 mg tablet, TAKE 1 TABLET(40 MG) BY MOUTH DAILY BEFORE BREAKFAST, Disp: 90 tablet, Rfl: 1    rosuvastatin (CRESTOR) 5 mg tablet, TAKE 1 TABLET(5 MG) BY MOUTH DAILY, Disp: 90 tablet, Rfl: 0  [3]   Patient Active Problem List  Diagnosis    Chronic bilateral low back pain without sciatica    Essential hypertension    GERD (gastroesophageal reflux disease)    Hyperlipidemia    Osteoarthritis    Cyst of uterus    Primary osteoarthritis of both knees    Chronic pain of both knees   [4]   Past Medical History:  Diagnosis Date    Arthritis     Hyperlipemia     Hypertension    [5]   Past Surgical History:  Procedure Laterality Date    TUBAL LIGATION  1994   [6]   Family History  Problem Relation Name Age of Onset    Hypertension Mother      Aneurysm Father      Hypertension Sister      Hyperlipidemia Sister

## 2025-05-28 NOTE — PATIENT INSTRUCTIONS
In order to minimize further degenerative changes and pain from arthritis we recommend maintaining an active lifestyle and continually trying to maintain a healthy weight / BMI (Body Mass Index).  If you are interested we can refer you to Weight Management to help with weight loss.  I recommended avoiding any tobacco use.  On rainy days and cold days you may have worsening pain than baseline.    You may use Advil (ibuprofen) 400-600mg every 6 hours or at least twice per day (OR Aleve (naproxen) 250-500mg every 12 hours as needed for pain and inflammation).  However do not mix or take other NSAIDs together such as Advil, Motrin, ibuprofen, Celebrex, naproxen, diclofenac or Aleve.    You may also take Tylenol (acetaminophen) together with Advil (ibuprofen) or Aleve (naproxen) as this is safe and can help decrease your pain levels.  The dosing for Tylenol is 500mg every 4-6 hours as needed OR max 1,000mg per dose up to 3 times per day for a total of 3,000mg per day  *Check with your primary care physician to see if these medications are safe to take and to make sure they do not interfere with your other medications and medical issues.            [email or web address] for more information     Steroid Joint Injection   AMBULATORY CARE:   What you need to know about steroid joint injection:  A steroid joint injection is a procedure to inject steroid medicine into a joint. Steroid medicine decreases pain and inflammation. The injection may also contain an anesthetic (numbing medicine) to decrease pain. It may be done to treat conditions such as arthritis, gout, or carpal tunnel syndrome. The injections may be given in your knee, ankle, shoulder, elbow, wrist, or ankle.   How to prepare for steroid joint injection:  Your healthcare provider will talk to you about how to prepare for this procedure. He will tell you what medicines to take or not take on the day of your procedure. You may need to stop taking blood thinners  several days before your procedure.   What will happen during steroid joint injection:  You may be given local anesthesia to numb the area where the injection will be given. With local anesthesia, you may still feel pressure during the procedure, but you should not feel any pain. Your healthcare provider may use ultrasound or fluoroscopy (a type of x-ray) to guide the needle to the right area. He will then inject the steroid into your joint. A bandage will be placed on the injection site.   What will happen after steroid joint injection:  You may have redness, warmth, or sweating in your face and chest right after the steroid injection. Steroids can affect blood sugar levels. If you have diabetes, you should check your blood sugars closely in the first 24 hours after your procedure.   Risks of steroid joint injection:  You may get an infection in your joint. The injection may also cause more pain during the first 24 to 36 hours. You may need more than one injection to feel pain relief. The skin near the injection site may be damaged and become discolored or indented. This can happen if the steroid is placed too close to your skin. A tendon near the injection site may rupture or a nerve can be damaged.  Contact your healthcare provider if:   You have fever or chills.     You have redness or swelling in the injection site.     You have more pain than usual in your joint for more than 72 hours.     You have questions or concerns about your condition or care.  Medicines:   Pain medicine  may be given. Ask how to take this medicine safely.     Take your medicine as directed.  Contact your healthcare provider if you think your medicine is not helping or if you have side effects. Tell him or her if you are allergic to any medicine. Keep a list of the medicines, vitamins, and herbs you take. Include the amounts, and when and why you take them. Bring the list or the pill bottles to follow-up visits. Carry your medicine list  with you in case of an emergency.  Self-care:   Leave the bandage on for 8 to 12 hours.  Care for your wound as directed.    Rest the area  as directed. You may need to decrease weight on certain joints, such as the knee, for a period of time. Ask when you can return to your daily activities.     Elevate  your limb where the steroid injection was given. Elevate the limb above the level of your heart as often as you can. This will help decrease swelling and pain. Prop your limb on pillows or blankets to keep it elevated comfortably.     Apply ice  on your joint for 15 to 20 minutes every hour or as directed. Use an ice pack, or put crushed ice in a plastic bag. Cover it with a towel. Ice helps prevent tissue damage and decreases swelling and pain.  Follow up with your healthcare provider as directed:  Write down your questions so you remember to ask them during your visits.   © 2017 [employer] Information is for End User's use only and may not be sold, redistributed or otherwise used for commercial purposes. All illustrations and images included in CareNotes® are the copyrighted property of [employer], [employer] or [employer].  The above information is an  only. It is not intended as medical advice for individual conditions or treatments. Talk to your doctor, nurse or pharmacist before following any medical regimen to see if it is safe and effective for you.      Arthritis   AMBULATORY CARE:   Arthritis  is a disease that causes inflammation in one or more joints. There are many types of arthritis, such as osteoarthritis, rheumatoid arthritis, and septic arthritis. Some types cause inflammation in the joints. Other types wear away the cartilage between joints. This makes the bones of the joint rub together when you move the joint. Your symptoms may be constant, or symptoms may come and go. Arthritis often gets worse over time and can cause permanent joint damage.  Common signs  and symptoms of arthritis:   Pain, swelling, or stiffness in the joint    Limited range of motion in the joint    Warmth or redness over the joint    Tenderness when you touch the joint    Stiff joints in the morning that loosen with movement    A creaking or grinding sound when you move the joint    Fever  Seek care immediately if:   You have a fever and severe joint pain or swelling.     You cannot move the affected joint.     You have severe joint pain you cannot tolerate.  Contact your healthcare provider if:   Your pain or swelling does not get better with treatment.    You have questions or concerns about your condition or care.  Treatment  will depend on the type of arthritis you have and if it is severe. You may need any of the following:  Acetaminophen  decreases pain and fever. It is available without a doctor's order. Ask how much to take and how often to take it. Follow directions. Acetaminophen can cause liver damage if not taken correctly.    NSAIDs , such as ibuprofen, help decrease swelling, pain, and fever. This medicine is available with or without a doctor's order. NSAIDs can cause stomach bleeding or kidney problems in certain people. If you take blood thinner medicine, always ask your healthcare provider if NSAIDs are safe for you. Always read the medicine label and follow directions.    Steroid medicine  helps reduce swelling and pain.     Surgery  may be needed to repair or replace a damaged joint.  Manage arthritis:   Rest your painful joint so it can heal.  Your healthcare provider may recommend crutches or a walker if the affected joint is in a leg.     Apply ice or heat to the joint.  Both can help decrease swelling and pain. Ice may also help prevent tissue damage. Use an ice pack, or put crushed ice in a plastic bag. Cover it with a towel and place it on your joint for 15 to 20 minutes every hour or as directed. You can apply heat for 20 minutes every 2 hours. Heat treatment includes hot  packs or heat lamps.    Elevate your joint.  Elevation helps reduce swelling and pain. Raise your joint above the level of your heart as often as you can. Prop your painful joint on pillows to keep it above your heart comfortably.    Go to physical or occupational therapy as directed.  A physical therapist can teach you exercises to improve flexibility and range of motion. You may also be shown non-weight-bearing exercises that are safe for your joints, such as swimming. Exercise can help keep your joints flexible and reduce pain. An occupational therapist can help you learn to do your daily activities when your joints are stiff or sore.    Maintain a healthy weight.  Extra weight puts increased pressure on your joints. Ask your healthcare provider what you should weigh. If you need to lose weight, he can help you create a weight loss program. Weight loss can help reduce pain and increase your ability to do your activities. The amount of exercise you do may vary each day, depending on your symptoms.    Wear flat or low-heeled shoes.  This will help decrease pain and reduce pressure on your ankle, knee, and hip joints.    Use support devices as directed.  You may be given splints to wear on your hands to help your joints rest and to decrease inflammation. While you sleep, use a pillow that is firm enough to support your neck and head.  Other equipment  that may help you move and prevent falls:  Orthotic shoes or insoles  help support your feet when you walk.    Crutches, a cane, or a walker  may help decrease your risk for falling. They also decrease stress on affected joints.     Devices to prevent falls  include raised toilet seats and bathtub bars to help you get up from sitting. Handrails can be placed in areas where you need balance and support.  Follow up with your healthcare provider or rheumatologist as directed:  Write down your questions so you remember to ask them during your visits.  © 2017 [employer] Information is for End User's use only and may not be sold, redistributed or otherwise used for commercial purposes. All illustrations and images included in CareNotes® are the copyrighted property of [employer] or [employer].  The above information is an  only. It is not intended as medical advice for individual conditions or treatments. Talk to your doctor, nurse or pharmacist before following any medical regimen to see if it is safe and effective for you.

## 2025-06-04 ENCOUNTER — EVALUATION (OUTPATIENT)
Dept: PHYSICAL THERAPY | Facility: MEDICAL CENTER | Age: 60
End: 2025-06-04
Attending: EMERGENCY MEDICINE
Payer: MEDICARE

## 2025-06-04 DIAGNOSIS — M17.0 PRIMARY OSTEOARTHRITIS OF BOTH KNEES: Primary | ICD-10-CM

## 2025-06-04 PROCEDURE — 97110 THERAPEUTIC EXERCISES: CPT | Performed by: PHYSICAL THERAPIST

## 2025-06-04 PROCEDURE — 97140 MANUAL THERAPY 1/> REGIONS: CPT | Performed by: PHYSICAL THERAPIST

## 2025-06-04 PROCEDURE — 97161 PT EVAL LOW COMPLEX 20 MIN: CPT | Performed by: PHYSICAL THERAPIST

## 2025-06-04 NOTE — PROGRESS NOTES
PT Evaluation     Today's date: 2025  Patient name: Yohana Sarmiento  : 1965  MRN: [id number]  Referring provider: Gopi Ayers MD  Dx:   Encounter Diagnosis   Name Primary?    Primary osteoarthritis of both knees Yes                  Assessment  Impairments: abnormal gait, abnormal muscle firing, abnormal or restricted ROM, activity intolerance, impaired physical strength, lacks appropriate home exercise program, pain with function and activity limitations    Assessment details: Yohana Sarmiento is a 60 y/o female who presents with complaints of chronic b/l knee pain.  The patient's greatest concern is not being able to walk without pain.  Primary movement impairment diagnosis of ambulatory dysfunction, resulting in pathoanatomical symptoms of primary osteoarthritis of both knees, which limits her ability to perform functional activities without pain.  Pt. will benefit from skilled PT services that includes manual therapy techniques to enhance tissue extensibility, neuromuscular re-education to facilitate motor control, therapeutic exercise to increase functional mobility, and modalities prn to reduce pain and inflammation.   Understanding of Dx/Px/POC: good     Prognosis: good  Prognosis details: Prognosis given patient compliance to HEP and POC.     Goals  Impairment Goals  - Pt I with initial HEP in 1-2 visits  - Improve ROM equal to contralateral side in 4-6 weeks  - Increase strength to 5/5 in all affected areas in 4-6 weeks    Functional Goals  - Increase Functional Status Measure to: goal status in 6-8 weeks  - Patient will be independent with comprehensive HEP in 6-8 weeks  - Patient will be able to ambulate with manageable pain in 6-8 weeks  - Patient will be able to negotiate stairs with manageable pain in 6-8 weeks       Plan  Patient would benefit from: PT eval    Planned therapy interventions: joint mobilization, manual therapy, neuromuscular re-education, strengthening, stretching,  therapeutic activities, therapeutic exercise, home exercise program, graded exercise, flexibility, abdominal trunk stabilization and body mechanics training    Frequency: 1-2x/week.  Therapy duration (weeks): 6-8 weeks.  Treatment plan discussed with: patient  Subjective Evaluation    History of Present Illness  Mechanism of injury: Patient reports having b/l knee pain since 2019 (L >R).  She had CSIs in the past with some relief.  The last CSI was done on the right knee in April, which lasted for about 6 weeks.  Patient denies back pain and LE paresthesias.  She would like to be able to walk for exercise, but is unable to do so because of the pain.  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  She is pursuing viscosupplementation and is considering having a consult with orthopedic surgery for possible future TKA.  Her goal for physical therapy is to have decreased pain for increased functional mobility.  Patient Goals  Patient goals for therapy: decreased pain  Patient goal: Patient would like to be able to walk.  Pain  Current pain rating: 10  At best pain ratin  At worst pain rating: 10  Quality: dull ache and tight  Relieving factors: heat (CSIs; Tylenol Arthritis)  Aggravating factors: walking and standing      Diagnostic Tests  Abnormal x-ray: Moderate to severe tricompartmental OA b/l knees (/).  Treatments  Current treatment: physical therapy    Objective     Observations   Left Knee   Negative for edema and effusion.     Right Knee   Negative for edema and effusion.     Neurological Testing     Sensation     Knee   Left Knee   Intact: Light touch    Right Knee   Intact: light touch     Additional Neurological Details  Reflexes NT.    Active Range of Motion   Left Knee   Flexion: 110 degrees   Extension: 0 degrees   Extensor la degrees     Right Knee   Flexion: 100 degrees   Extension: -12 degrees   Extensor lag: 10 degrees     Passive Range of Motion   Left Hip   Normal passive range  of motion    Right Hip   Normal passive range of motion  Left Knee   Flexion: 110 degrees   Extension: 0 degrees     Right Knee   Flexion: 110 degrees   Extension: -10 degrees     Mobility   Patellar Mobility:   Left Knee   Hypomobile: left medial, left lateral, left superior and left inferior    Right Knee   Hypomobile: medial, lateral, superior and inferior     Patellar Static Positioning   Left Knee: WFL  Right Knee: WFL    Strength/Myotome Testing     Left Hip   Planes of Motion   Flexion: 4-  Extension: 3-  Abduction: 3-  Adduction: 3-    Right Hip   Planes of Motion   Flexion: 4-  Extension: 3+  Abduction: 3+  Adduction: 3+    Left Knee   Flexion: 3-  Extension: 3-  Quadriceps contraction: poor    Right Knee   Flexion: 3-  Extension: 3-  Quadriceps contraction: poor    Ambulation     Observational Gait   Gait: antalgic   Decreased walking speed and left stance time.     Functional Assessment      Squat    Left tibial anterior translation beyond toes and right tibial anterior translation beyond toes.     Posterior weight shift: poor    Depth of femur height: above 90 degrees    Torso not parallel with tibia          Precautions: HTN      Manuals 6/4            Tib-fem mobs AZ            Patellar mobs AZ            STM/MFR             PROM             Neuro Re-Ed             Quad sets 3x10 5s                                                                                          Ther Ex             Bike             Heel slides 3x10                         Long sitting gastroc str 3x30s            Hamstring str EOT 3x30s                                                   Ther Activity                                       Gait Training                                       Modalities              10'                                 Radha Burr, PT  6/4/2025,10:18 AM

## 2025-06-09 ENCOUNTER — APPOINTMENT (OUTPATIENT)
Dept: PHYSICAL THERAPY | Facility: MEDICAL CENTER | Age: 60
End: 2025-06-09
Attending: EMERGENCY MEDICINE
Payer: MEDICARE

## 2025-06-16 ENCOUNTER — APPOINTMENT (OUTPATIENT)
Dept: PHYSICAL THERAPY | Facility: MEDICAL CENTER | Age: 60
End: 2025-06-16
Attending: EMERGENCY MEDICINE
Payer: MEDICARE

## 2025-06-18 ENCOUNTER — TELEPHONE (OUTPATIENT)
Dept: OBGYN CLINIC | Facility: HOSPITAL | Age: 60
End: 2025-06-18

## 2025-06-18 NOTE — TELEPHONE ENCOUNTER
Caller: Patient    Doctor: Dr. Ayers     Reason for call: Patient wants to confirm she is getting GEL injection on her appt 7/14 please contact patient     Call back#: [phone]

## 2025-06-19 ENCOUNTER — APPOINTMENT (OUTPATIENT)
Dept: PHYSICAL THERAPY | Facility: MEDICAL CENTER | Age: 60
End: 2025-06-19
Attending: EMERGENCY MEDICINE
Payer: MEDICARE

## 2025-06-23 ENCOUNTER — APPOINTMENT (OUTPATIENT)
Dept: PHYSICAL THERAPY | Facility: MEDICAL CENTER | Age: 60
End: 2025-06-23
Attending: EMERGENCY MEDICINE
Payer: MEDICARE

## 2025-06-25 ENCOUNTER — APPOINTMENT (OUTPATIENT)
Dept: PHYSICAL THERAPY | Facility: MEDICAL CENTER | Age: 60
End: 2025-06-25
Attending: EMERGENCY MEDICINE
Payer: MEDICARE

## 2025-06-30 ENCOUNTER — APPOINTMENT (OUTPATIENT)
Dept: PHYSICAL THERAPY | Facility: MEDICAL CENTER | Age: 60
End: 2025-06-30
Attending: EMERGENCY MEDICINE
Payer: MEDICARE

## 2025-07-14 ENCOUNTER — PROCEDURE VISIT (OUTPATIENT)
Dept: OBGYN CLINIC | Facility: MEDICAL CENTER | Age: 60
End: 2025-07-14
Payer: MEDICARE

## 2025-07-14 VITALS — WEIGHT: 193 LBS | HEIGHT: 70 IN | BODY MASS INDEX: 27.63 KG/M2

## 2025-07-14 DIAGNOSIS — G89.29 CHRONIC PAIN OF BOTH KNEES: Primary | ICD-10-CM

## 2025-07-14 DIAGNOSIS — M25.561 CHRONIC PAIN OF BOTH KNEES: Primary | ICD-10-CM

## 2025-07-14 DIAGNOSIS — M25.562 CHRONIC PAIN OF BOTH KNEES: Primary | ICD-10-CM

## 2025-07-14 DIAGNOSIS — M17.0 PRIMARY OSTEOARTHRITIS OF BOTH KNEES: ICD-10-CM

## 2025-07-14 PROCEDURE — 20610 DRAIN/INJ JOINT/BURSA W/O US: CPT | Performed by: EMERGENCY MEDICINE

## 2025-07-14 NOTE — ASSESSMENT & PLAN NOTE
Left knee visco provided today, patient elected to hold off on right knee at this time  B/L knee CSIs   Severe OA  [facility]

## 2025-07-14 NOTE — PROGRESS NOTES
"Name: Yohana Sarmiento      : 1965      MRN: [id number]  Encounter Provider: Gopi Ayers MD  Encounter Date: 2025   Encounter department: [facility] [place]  :  Assessment & Plan  Chronic pain of both knees  Primary osteoarthritis of both knees  Left knee visco provided today, patient elected to hold off on right knee at this time  B/L knee CSIs   Severe OA  [facility]           No follow-ups on file.      Subjective:     History of Present Illness   HPI      Review of Systems    The following portions of the patient's chart were reviewed and updated as appropriate:   Allergy:  Allergies[1]    Medications:  Current Medications[2]    Problem List[3]    Objective:  Objective   Ht 5' 10\" (1.778 m)   Wt 87.5 kg (193 lb)   BMI 27.69 kg/m²         Ortho Exam    Physical Exam      Neurologic Exam    Large joint arthrocentesis: L knee    Performed by: Gopi Ayers MD  Authorized by: Gopi Ayers MD    Universal Protocol:  Consent: Verbal consent obtained  Risks and benefits: risks, benefits and alternatives were discussed  Consent given by: patient  Time out: Immediately prior to procedure a \"time out\" was called to verify the correct patient, procedure, equipment, support staff and site/side marked as required.  Timeout called at: 2025 10:24 AM.  Patient understanding: patient states understanding of the procedure being performed  Test results: test results available and properly labeled  Site marked: the operative site was marked  Patient identity confirmed: verbally with patient  Supporting Documentation  Indications: pain     Is this a Visco injection? Yes  Non-Pharmacologic Treatments Attempted: Home Exercise  Pharmacologic Treatments Attempted: OTC Meds  Pain Score: 10Procedure Details  Location: knee - L knee  Preparation: Patient was prepped and draped in the usual sterile fashion  Needle size: 20 G  Ultrasound guidance: no  Approach: " anterolateral  Medications administered: 3 mL sodium hyaluronate 60 MG/3ML  Specialty Pharmacy Supplied: received medications from pharmacy  Patient tolerance: patient tolerated the procedure well with no immediate complications  Dressing:  Sterile dressing applied    No erythema of knees          I have personally reviewed the written report of the pertinent studies.             Past Medical History[4]    Past Surgical History[5]    Social History     Socioeconomic History    Marital status: Single     Spouse name: Not on file    Number of children: Not on file    Years of education: Not on file    Highest education level: Not on file   Occupational History    Not on file   Tobacco Use    Smoking status: Never    Smokeless tobacco: Never   Vaping Use    Vaping status: Never Used   Substance and Sexual Activity    Alcohol use: Never    Drug use: Never    Sexual activity: Not on file   Other Topics Concern    Not on file   Social History Narrative    Not on file     Social Drivers of Health     Financial Resource Strain: Not on file   Food Insecurity: No Food Insecurity (1/3/2024)    Received from [facility], [facility], and [facility]    Hunger Vital Sign     Within the past 12 months, you worried that your food would run out before you got the money to buy more.: Never true     Within the past 12 months, the food you bought just didn't last and you didn't have money to get more.: Never true   Transportation Needs: No Transportation Needs (1/3/2024)    Received from [facility], [facility], and [facility]    PRAPARE - Transportation     Lack of Transportation (Medical): No     Lack of Transportation (Non-Medical): No   Physical Activity: Not on file   Stress: Not on file   Social Connections: Not on file   Intimate Partner Violence: Not on file   Housing Stability: Low Risk  (1/3/2024)    Received from [facility], [facility], and [facility]    Housing Stability Vital Sign     Unable to Pay for Housing in the Last Year: No     Number of Places Lived in the Last Year: 1     Unstable Housing in the Last Year: No       Family History[6]           [1] No Known Allergies  [2]   Current Outpatient Medications:     amLODIPine (NORVASC) 10 mg tablet, TAKE 1 TABLET(10 MG) BY MOUTH DAILY, Disp: 90 tablet, Rfl: 1    Diclofenac Sodium (VOLTAREN) 1 %, Apply 2 g topically 4 (four) times a day, Disp: 20 g, Rfl: 0    famotidine (PEPCID) 40 MG tablet, Take 1 tablet (40 mg total) by mouth daily at bedtime, Disp: 90 tablet, Rfl: 1    methocarbamol (ROBAXIN) 500 mg tablet, Take 1 tablet (500 mg total) by mouth 2 (two) times a day, Disp: 20 tablet, Rfl: 0    naproxen (NAPROSYN) 500 mg tablet, Take 1 tablet (500 mg total) by mouth 2 (two) times a day with meals, Disp: 30 tablet, Rfl: 0    pantoprazole (PROTONIX) 40 mg tablet, TAKE 1 TABLET(40 MG) BY MOUTH DAILY BEFORE BREAKFAST, Disp: 90 tablet, Rfl: 1    rosuvastatin (CRESTOR) 5 mg tablet, TAKE 1 TABLET(5 MG) BY MOUTH DAILY, Disp: 90 tablet, Rfl: 0  [3]   Patient Active Problem List  Diagnosis    Chronic bilateral low back pain without sciatica    Essential hypertension    GERD (gastroesophageal reflux disease)    Hyperlipidemia    Osteoarthritis    Cyst of uterus    Primary osteoarthritis of both knees    Chronic pain of both knees   [4]   Past Medical History:  Diagnosis Date    Arthritis     Hyperlipemia     Hypertension    [5]   Past Surgical History:  Procedure Laterality Date    TUBAL LIGATION  1994   [6]   Family History  Problem Relation Name Age of Onset    Hypertension Mother      Aneurysm Father      Hypertension Sister      Hyperlipidemia Sister

## 2025-08-11 ENCOUNTER — TELEPHONE (OUTPATIENT)
Dept: OBGYN CLINIC | Facility: MEDICAL CENTER | Age: 60
End: 2025-08-11